# Patient Record
Sex: FEMALE | Race: WHITE | HISPANIC OR LATINO | URBAN - METROPOLITAN AREA
[De-identification: names, ages, dates, MRNs, and addresses within clinical notes are randomized per-mention and may not be internally consistent; named-entity substitution may affect disease eponyms.]

---

## 2021-06-24 ENCOUNTER — EMERGENCY (EMERGENCY)
Facility: HOSPITAL | Age: 31
LOS: 1 days | Discharge: HOME | End: 2021-06-24
Attending: STUDENT IN AN ORGANIZED HEALTH CARE EDUCATION/TRAINING PROGRAM
Payer: MEDICAID

## 2021-06-24 VITALS
WEIGHT: 119.05 LBS | OXYGEN SATURATION: 98 % | TEMPERATURE: 99 F | HEART RATE: 84 BPM | RESPIRATION RATE: 18 BRPM | SYSTOLIC BLOOD PRESSURE: 131 MMHG | DIASTOLIC BLOOD PRESSURE: 83 MMHG

## 2021-06-24 VITALS
DIASTOLIC BLOOD PRESSURE: 70 MMHG | SYSTOLIC BLOOD PRESSURE: 103 MMHG | RESPIRATION RATE: 18 BRPM | HEART RATE: 70 BPM | OXYGEN SATURATION: 98 %

## 2021-06-24 DIAGNOSIS — O36.80X0 PREGNANCY WITH INCONCLUSIVE FETAL VIABILITY, NOT APPLICABLE OR UNSPECIFIED: ICD-10-CM

## 2021-06-24 LAB
ALBUMIN SERPL ELPH-MCNC: 4.2 G/DL — SIGNIFICANT CHANGE UP (ref 3.3–5.2)
ALP SERPL-CCNC: 67 U/L — SIGNIFICANT CHANGE UP (ref 40–120)
ALT FLD-CCNC: 10 U/L — SIGNIFICANT CHANGE UP
ANION GAP SERPL CALC-SCNC: 10 MMOL/L — SIGNIFICANT CHANGE UP (ref 5–17)
APPEARANCE UR: CLEAR — SIGNIFICANT CHANGE UP
AST SERPL-CCNC: 15 U/L — SIGNIFICANT CHANGE UP
BACTERIA # UR AUTO: NEGATIVE — SIGNIFICANT CHANGE UP
BASOPHILS # BLD AUTO: 0.02 K/UL — SIGNIFICANT CHANGE UP (ref 0–0.2)
BASOPHILS NFR BLD AUTO: 0.4 % — SIGNIFICANT CHANGE UP (ref 0–2)
BILIRUB SERPL-MCNC: 0.2 MG/DL — LOW (ref 0.4–2)
BILIRUB UR-MCNC: NEGATIVE — SIGNIFICANT CHANGE UP
BLD GP AB SCN SERPL QL: SIGNIFICANT CHANGE UP
BUN SERPL-MCNC: 7.4 MG/DL — LOW (ref 8–20)
CALCIUM SERPL-MCNC: 9.3 MG/DL — SIGNIFICANT CHANGE UP (ref 8.6–10.2)
CHLORIDE SERPL-SCNC: 107 MMOL/L — SIGNIFICANT CHANGE UP (ref 98–107)
CO2 SERPL-SCNC: 24 MMOL/L — SIGNIFICANT CHANGE UP (ref 22–29)
COLOR SPEC: YELLOW — SIGNIFICANT CHANGE UP
CREAT SERPL-MCNC: 0.68 MG/DL — SIGNIFICANT CHANGE UP (ref 0.5–1.3)
DIFF PNL FLD: ABNORMAL
EOSINOPHIL # BLD AUTO: 0.08 K/UL — SIGNIFICANT CHANGE UP (ref 0–0.5)
EOSINOPHIL NFR BLD AUTO: 1.6 % — SIGNIFICANT CHANGE UP (ref 0–6)
EPI CELLS # UR: SIGNIFICANT CHANGE UP
GLUCOSE SERPL-MCNC: 83 MG/DL — SIGNIFICANT CHANGE UP (ref 70–99)
GLUCOSE UR QL: NEGATIVE MG/DL — SIGNIFICANT CHANGE UP
HCG SERPL-ACNC: 4305 MIU/ML — HIGH
HCT VFR BLD CALC: 36.7 % — SIGNIFICANT CHANGE UP (ref 34.5–45)
HGB BLD-MCNC: 12.5 G/DL — SIGNIFICANT CHANGE UP (ref 11.5–15.5)
IMM GRANULOCYTES NFR BLD AUTO: 0.4 % — SIGNIFICANT CHANGE UP (ref 0–1.5)
KETONES UR-MCNC: NEGATIVE — SIGNIFICANT CHANGE UP
LEUKOCYTE ESTERASE UR-ACNC: NEGATIVE — SIGNIFICANT CHANGE UP
LYMPHOCYTES # BLD AUTO: 1.22 K/UL — SIGNIFICANT CHANGE UP (ref 1–3.3)
LYMPHOCYTES # BLD AUTO: 23.6 % — SIGNIFICANT CHANGE UP (ref 13–44)
MCHC RBC-ENTMCNC: 30.6 PG — SIGNIFICANT CHANGE UP (ref 27–34)
MCHC RBC-ENTMCNC: 34.1 GM/DL — SIGNIFICANT CHANGE UP (ref 32–36)
MCV RBC AUTO: 89.7 FL — SIGNIFICANT CHANGE UP (ref 80–100)
MONOCYTES # BLD AUTO: 0.36 K/UL — SIGNIFICANT CHANGE UP (ref 0–0.9)
MONOCYTES NFR BLD AUTO: 7 % — SIGNIFICANT CHANGE UP (ref 2–14)
NEUTROPHILS # BLD AUTO: 3.46 K/UL — SIGNIFICANT CHANGE UP (ref 1.8–7.4)
NEUTROPHILS NFR BLD AUTO: 67 % — SIGNIFICANT CHANGE UP (ref 43–77)
NITRITE UR-MCNC: NEGATIVE — SIGNIFICANT CHANGE UP
PH UR: 7 — SIGNIFICANT CHANGE UP (ref 5–8)
PLATELET # BLD AUTO: 195 K/UL — SIGNIFICANT CHANGE UP (ref 150–400)
POTASSIUM SERPL-MCNC: 3.8 MMOL/L — SIGNIFICANT CHANGE UP (ref 3.5–5.3)
POTASSIUM SERPL-SCNC: 3.8 MMOL/L — SIGNIFICANT CHANGE UP (ref 3.5–5.3)
PROT SERPL-MCNC: 7.2 G/DL — SIGNIFICANT CHANGE UP (ref 6.6–8.7)
PROT UR-MCNC: 15 MG/DL
RBC # BLD: 4.09 M/UL — SIGNIFICANT CHANGE UP (ref 3.8–5.2)
RBC # FLD: 12 % — SIGNIFICANT CHANGE UP (ref 10.3–14.5)
RBC CASTS # UR COMP ASSIST: ABNORMAL /HPF (ref 0–4)
SODIUM SERPL-SCNC: 141 MMOL/L — SIGNIFICANT CHANGE UP (ref 135–145)
SP GR SPEC: 1.01 — SIGNIFICANT CHANGE UP (ref 1.01–1.02)
UROBILINOGEN FLD QL: NEGATIVE MG/DL — SIGNIFICANT CHANGE UP
WBC # BLD: 5.16 K/UL — SIGNIFICANT CHANGE UP (ref 3.8–10.5)
WBC # FLD AUTO: 5.16 K/UL — SIGNIFICANT CHANGE UP (ref 3.8–10.5)
WBC UR QL: SIGNIFICANT CHANGE UP

## 2021-06-24 PROCEDURE — 85025 COMPLETE CBC W/AUTO DIFF WBC: CPT

## 2021-06-24 PROCEDURE — 36415 COLL VENOUS BLD VENIPUNCTURE: CPT

## 2021-06-24 PROCEDURE — 99284 EMERGENCY DEPT VISIT MOD MDM: CPT | Mod: 25

## 2021-06-24 PROCEDURE — 86850 RBC ANTIBODY SCREEN: CPT

## 2021-06-24 PROCEDURE — 86900 BLOOD TYPING SEROLOGIC ABO: CPT

## 2021-06-24 PROCEDURE — 87086 URINE CULTURE/COLONY COUNT: CPT

## 2021-06-24 PROCEDURE — 80053 COMPREHEN METABOLIC PANEL: CPT

## 2021-06-24 PROCEDURE — 81001 URINALYSIS AUTO W/SCOPE: CPT

## 2021-06-24 PROCEDURE — 99284 EMERGENCY DEPT VISIT MOD MDM: CPT

## 2021-06-24 PROCEDURE — 86901 BLOOD TYPING SEROLOGIC RH(D): CPT

## 2021-06-24 PROCEDURE — 76801 OB US < 14 WKS SINGLE FETUS: CPT | Mod: 26

## 2021-06-24 PROCEDURE — 76817 TRANSVAGINAL US OBSTETRIC: CPT | Mod: 26

## 2021-06-24 PROCEDURE — 76801 OB US < 14 WKS SINGLE FETUS: CPT

## 2021-06-24 PROCEDURE — 76817 TRANSVAGINAL US OBSTETRIC: CPT

## 2021-06-24 PROCEDURE — 84702 CHORIONIC GONADOTROPIN TEST: CPT

## 2021-06-24 RX ORDER — ACETAMINOPHEN 500 MG
650 TABLET ORAL ONCE
Refills: 0 | Status: COMPLETED | OUTPATIENT
Start: 2021-06-24 | End: 2021-06-24

## 2021-06-24 RX ADMIN — Medication 650 MILLIGRAM(S): at 12:05

## 2021-06-24 RX ADMIN — Medication 650 MILLIGRAM(S): at 12:55

## 2021-06-24 NOTE — ED STATDOCS - ATTENDING CONTRIBUTION TO CARE
I, Radha Toro, performed a face to face bedside interview with this patient regarding history of present illness, review of symptoms and relevant past medical, social and family history.  I completed an independent physical examination. Medical decision making, follow-up on ordered tests (ie labs, radiologic studies) and re-evaluation of the patient's status has been communicated to the ACP.  Disposition of the patient will be based on test outcome and response to ED interventions.

## 2021-06-24 NOTE — ED ADULT NURSE NOTE - OBJECTIVE STATEMENT
Assumed care of pt at 11:44 in . Pt A&Ox3 c/o vaginal bleeding and cramping x1 month. Pt states her last period was 5/23 and has not stopped bleeding since. Pt states she bleeds through 1 pad a day. Pt has recently arrived from [facility] and has not seen PMD or OB since arrival. Pt denies significant PMH. Assumed care of pt at 11:44 in . Pt A&Ox3 c/o vaginal bleeding and cramping x1 month. Pt states her last period was 5/23 and has not stopped bleeding since. Pt states she bleeds through 1 pad a day. Pt has recently arrived from [facility] and has not seen PMD or OB since arrival. Pt denies significant PMH. Hospital  used.

## 2021-06-24 NOTE — CONSULT NOTE ADULT - SUBJECTIVE AND OBJECTIVE BOX
Name: HORTENSIA ZAMORA  MRN: [id number]    HORTENSIA ZAMORA is a 30y  LMP approximately 2021 presenting with vaginal bleeding for one month and elevated hCG.  She reports the onset of bleeding approximately 1 month ago that has been consistent. She reports the bleeding resembles her periods and she uses 4 pads per day. She reports diffuse abdominal cramping during this time, nausea in the morning, and breast tenderness. She also reports urinary discomfort but no increased frequency. She reports feeling dizzy for the last 3 weeks.   She found out today that she has an elevated hCG. She did not know that she was pregnant. She recently moved here from [facility] and does not have obstetrical or gynecologic care.     PAST OBSTETRICAL HISTORY:  -  ()    PAST GYN HISTORY: Denies history of abnormal paps, STDs, ovarian cysts, or uterine fibroids. + yeast  Menarche at 14, irregular cycles     PAST MEDICAL HISTORY:  Irregular menses      PAST SURGICAL HISTORY:  No significant past surgical history      Home Medications:      SOCIAL:  Denies tobacco or drug use. Social drinker. Feels safe at home.     ALLERGIES:  No Known Allergies      ROS:  CONSTITUTIONAL: No fever, weight loss, or fatigue  BREASTS: No pain, masses, or nipple discharge. + tenderness  RESPIRATORY: No shortness of breath  CARDIOVASCULAR: No chest pain, palpitations, or leg swelling. + dizziness  GASTROINTESTINAL: + abdominal pain, nausea. - vomiting, diarrhea or constipation.   GENITOURINARY: No dysuria or incontinence  SKIN: No itching, burning, rashes, or lesions   ENDOCRINE: No heat or cold intolerance; No hair loss  PSYCHIATRIC: No depression, anxiety, mood swings, or difficulty sleeping  HEME/LYMPH: No easy bruising, or bleeding gums    Vital Signs Last 24 Hrs  T(C): 37.2 (2021 11:06), Max: 37.2 (2021 11:06)  T(F): 98.9 (2021 11:06), Max: 98.9 (2021 11:06)  HR: 70 (2021 16:33) (70 - 84)  BP: 103/70 (2021 16:33) (103/70 - 131/83)  RR: 18 (2021 16:33) (18 - 18)  SpO2: 98% (2021 16:33) (98% - 98%)    PHYSICAL EXAM:  GEN: NAD, AOx3  CV: S1S2, RRR.  Pulm: CTABL, no WRR. Speaking in full sentences without shortness of breath.  Abd: Soft, Nontender, Nondistended. No rebound tenderness or guarding. Bowel sounds present  PELVIC:        EXTERNAL GENITALIA: atraumatic, no lesions        VAGINA: dark blood in the vaginal vault, - blood on valsalva        CERVIX: Pink, closed, multiparous        UTERUS: mildly enlarged, approximately 8 weeks        ADNEXA: not palpable, non-tender    LABS:                        12.5   5.16  )-----------( 195      ( 2021 12:06 )             36.7         141  |  107  |  7.4<L>  ----------------------------<  83  3.8   |  24.0  |  0.68    Ca    9.3      2021 12:06    TPro  7.2  /  Alb  4.2  /  TBili  0.2<L>  /  DBili  x   /  AST  15  /  ALT  10  /  AlkPhos  67      Urinalysis Basic - ( 2021 12:07 )    Color: Yellow / Appearance: Clear / S.010 / pH: x  Gluc: x / Ketone: Negative  / Bili: Negative / Urobili: Negative mg/dL   Blood: x / Protein: 15 mg/dL / Nitrite: Negative   Leuk Esterase: Negative / RBC: 6-10 /HPF / WBC 3-5   Sq Epi: x / Non Sq Epi: Occasional / Bacteria: Negative      HCG Quantitative, Serum: 4305.0 mIU/mL (21 @ 12:06)    ABO RH Interpretation: A POS (21 @ 12:22)      RADIOLOGY STUDIES:  < from: US Transvaginal, OB (21 @ 14:10) >     EXAM:  US OB LES THAN 14 WKS 1ST GEST                         EXAM:  US OB TRANSVAGINAL                          PROCEDURE DATE:  2021          INTERPRETATION:  CLINICAL INFORMATION: Pregnancy with vaginal bleeding, beta-hCG 4300    LMP: 2021    Estimated Gestational Age by LMP:    COMPARISON: None available.    Endovaginal and transabdominal pelvic sonogram.    FINDINGS:  Uterus: Normal size    12 mm endometrial thickness without evidence for intrauterine gestational sac or yolk sac.    Right ovary: 3.5 cm x 2.1 cm x 2.9 cm. Within normal limits.  Left ovary: 2.5 cm x 1.7 cm x 2.5 cm. Within normal limits.    Fluid: None.    IMPRESSION:  No intrauterine gestational sac or yolk sac. No adnexal mass or complex pelvic free fluid.Findings represent pregnancy of unknown location, for which differential diagnosis includes early IUP, occult ectopic pregnancy, or spontaneous . Continued close obstetrical and sonographic follow-up is advised.            SELWYN CHRISTIANSON MD; Attending Radiologist  This document has been electronically signed. 2021  3:56PM    < end of copied text >

## 2021-06-24 NOTE — ED STATDOCS - NSFOLLOWUPINSTRUCTIONS_ED_ALL_ED_FT
- John un seguimiento con subramanian médico de atención primaria en 1 o 2 días. Si no puede hacer un seguimiento con subramanian médico de atención primaria, regrese al servicio de urgencias por cualquier problema urgente.  - Busque atención médica inmediata ante cualquier signo o síntoma nuevo, que empeore o que le preocupe.  - Se le entregaron copias de todos los resultados de kailash pruebas, llévelas a subramanian médico de atención primaria para que revise los resultados anormales  **Regrese al departamento de emergencias moi sábado 24 de junio para exámenes de laboratorio y ultrasonido.**      - Si tiene dificultades para realizar un seguimiento, llame al: [phone] ([phone]) o visite [email or web address] para obtener un médico o especialista de [facility] que acepte subramanian seguro en subramanian área.    ¡Sentirse mejor!   Hemorragia vaginal catian el embarazo (primer trimestre)    Vaginal Bleeding During Pregnancy, First Trimester       Catina los primeros meses del embarazo, es común tener baldomero pequeña hemorragia vaginal (manchado). A veces, la hemorragia es normal y no causa problemas. En otras ocasiones, sin embargo, la hemorragia puede ser baldomero señal de algo grave. Informe a subramanian médico de inmediato si tiene algún tipo de hemorragia vaginal.      Siga estas indicaciones en subramanian casa:    Actividad     •Siga las indicaciones de subramanian médico con respecto al nader de actividad que puede realizar.      •De ser necesario, organícese para que alguien la ayude con las actividades habituales.      • No tenga relaciones sexuales ni orgasmos hasta que el médico le diga que es seguro.      Instrucciones generales     •[place] los medicamentos de venta tc y los recetados solamente ciara se lo haya indicado el médico.      •Controle subramanian afección para detectar cualquier cambio.    •Escriba los siguientes datos:  •La cantidad de toallas higiénicas que usa cada día.      •La frecuencia con la que se cambia las toallas higiénicas.      •Qué tan empapadas (saturadas) están.        • No use tampones.      • No se john duchas vaginales.      •Si elimina tejido por la vagina, guárdelo para mostrárselo al médico.      •Concurra a todas las visitas de seguimiento ciara se lo haya indicado el médico. [place] es importante.        Comuníquese con un médico si:    •Tiene baldomero hemorragia vaginal catina el embarazo.      •Tiene cólicos.      •Tiene fiebre.        Solicite ayuda de inmediato si:    •Siente cólicos muy intensos en la espalda o en el vientre (abdomen).      •Elimina coágulos grandes o mucho tejido por la vagina.      •La hemorragia empeora.      •Siente que va a desvanecerse.      •Se siente débil.      •Pierde el conocimiento (se desmaya).      •Tiene escalofríos.      •Tiene baldomero pérdida de líquido por la vagina.      •Tiene baldomero pérdida de líquido abundante por la vagina.        Resumen    •A veces, la hemorragia vaginal catina el embarazo es normal y no causa problemas. En otras ocasiones, la hemorragia puede ser baldomero señal de algo grave.      •Informe a subramanian médico de inmediato si tiene algún tipo de hemorragia vaginal.      •Siga las indicaciones de subramanian médico con respecto al naedr de actividad que puede realizar. [place] necesite que alguien la ayude con las actividades habituales.      Esta información no tiene ciara fin reemplazar el consejo del médico. Asegúrese de hacerle al médico cualquier pregunta que tenga.

## 2021-06-24 NOTE — ED STATDOCS - NS ED ROS FT
ROS:  GEN: (-) fevers/chills  NECK: (-) stiffness, (-) swelling  RESP: (-) shortness of breath, (-) cough  CV: (-) chest pain, (-) palpitations  GI: (-) nausea, (-) vomiting, (-) pain, (-) constipation, (-) diarrhea  : (-) hematuria, (-) dysuria (+) vaginal bleeding  EXT: (-) edema  NEURO: (-) weakness, (-) headache, (-) dizziness, (-) syncope  MSK: (-) muscle pain

## 2021-06-24 NOTE — ED STATDOCS - PATIENT PORTAL LINK FT
No You can access the FollowMyHealth Patient Portal offered by [facility] by registering at the following website: [email or web address]. By joining [facility]’s FollowMyHealth portal, you will also be able to view your health information using other applications (apps) compatible with our system.

## 2021-06-24 NOTE — ED STATDOCS - NSFOLLOWUPCLINICS_GEN_ALL_ED_FT
[id number] [place] [address]  Phone: [phone]  Fax:   Scheduled Appointment: 6/28/2021

## 2021-06-24 NOTE — ED STATDOCS - PROGRESS NOTE DETAILS
CATRACHITO William NOTE: Pt evaluated at bedside. Pt with vaginal bleeding for approx 4 weeks, going through 4 pads per day. Does not have GYN yet in US. Reports abdominal cramping, did not take anything today yet for pain  Pt evaluated prior by intake physician. Otherwise HPI/PE/ROS as noted above. Will follow up plan per intake physician. ED  Iraida Moise CATRACHITO William NOTE: + HCG, ultrasound ordered, pt informed of + HCG and need for ultrasound. ED  Iraida Moise utilized CATRACHITO William NOTE: US shows pregnancy unknown location. Seen by GYN who recommends close 48 hour follow up. Abd soft NTND no rebound or guarding. Discussion includes results, plan, and return precautions. Pt advised to f/u with PMD 1-2 days and specialists discussed.  Printed copies of available lab/radiology results contained within discharge packet. Pt verbalized understanding/agreement of plan. ED  Shaniqua Ball

## 2021-06-24 NOTE — CONSULT NOTE ADULT - ASSESSMENT
30 y.o.  LMP approximately 2021 evaluated for 1 month of vaginal bleeding and an elevated hcg of 4305. Differential diagnosis includes pregnancy of unknown location vs spontaneous  vs ectopic pregnancy. Patient reports diffuse abdominal pain with mild tenderness of left hand side, although pain is not elicited on exam. Her ultrasound does not show any pregnancy in the uterus, tubes, or adnexa at this time. Although she has an elevated hcg, this will need to be trended to see if it's increasing or decreasing to help with diagnosis. Patient was educated on the blood work and ultrasound results and given strict precautions to return to the ED if she is to have increased pain that does not improve with tylenol, pain that is localized to one side, increased vaginal bleeding, or signs of anemia such as dizziness, shortness of breath, fatigue, or chest pain. She is advised to return to the ED on  for a repeat beta hcg and ultrasound. She will follow up with the [facility] on  and establish care at that time.     Patient discussed with Elida Rascon and Ger. 30 y.o.  LMP approximately 2021 evaluated for 1 month of vaginal bleeding and an elevated hcg of 4305. Differential diagnosis includes pregnancy of unknown location vs spontaneous  vs ectopic pregnancy. Patient reports diffuse abdominal pain with mild tenderness of left hand side, although pain is not elicited on exam. She is hemodynamically stable at this time. Her ultrasound does not show any pregnancy in the uterus, tubes, or adnexa at this time. Although she has an elevated hcg, this will need to be trended to see if it's increasing or decreasing to help with diagnosis. Patient was educated on the blood work and ultrasound results and given strict precautions to return to the ED if she is to have increased pain that does not improve with tylenol, pain that is localized to one side, increased vaginal bleeding, or signs of anemia such as dizziness, shortness of breath, fatigue, or chest pain. She is advised to return to the ED on  for a repeat beta hcg and ultrasound. She will follow up with the [facility] clinic on  and establish care at that time.     Patient discussed with Elida Rascon and Ger. 30 y.o.  LMP approximately 2021 evaluated for 1 month of vaginal bleeding, abdominal discomfort, and an elevated hcg of 4305. Patient's abdominal exam was completely benign and there was minimal bleeding on the vaginal exam. She is hemodynamically stable at this time.  Differential diagnosis includes pregnancy of unknown location vs spontaneous  vs ectopic pregnancy. She has been bleeding for a whole month with no intrauterine gestation, making a spontaneous  likely. However, due to the elevated beta and no definitive evidence that there was an intrauterine gestation, ectopic cannot be ruled out at this time, hence this is currently a pregnancy of unknown location until proven otherwise.   The labs and images were reviewed and explained to the patient that although her beta hcg is above the discriminatory zone, a pregnancy was not visualized within the uterus, tubes or ovaries. Although she has an elevated hcg, one result does not give us enough information to have a definite diagnosis. It was explained that this will need repeat beta hcg to evaluate the trend to aid in diagnosis as well as sonograms. Patient was educated on the blood work and ultrasound results and given strict precautions to return to the ED if she is to have increased pain that does not improve with tylenol, pain that is localized to one side, increased vaginal bleeding, or signs of anemia such as dizziness, shortness of breath, fatigue, or chest pain. She is advised to return to the ED on  for a repeat beta hcg and ultrasound. She will follow up with the [facility] on  and establish care at that time.     Patient discussed with Elida Rascon and Ger.

## 2021-06-24 NOTE — ED STATDOCS - CLINICAL SUMMARY MEDICAL DECISION MAKING FREE TEXT BOX
Patient with vaginal bleeding x1 month, abdomen soft, non tender. Will check labs, HCG levels and re-assess.

## 2021-06-24 NOTE — ED STATDOCS - OBJECTIVE STATEMENT
29 y/o female with no prior PMHx of presents to the ED c/o vaginal bleeding. Patient reports of vaginal bleeding since May 25th with lower abdominal cramping. Patient also reports of having irregular period in the past but never like this before. Patient does not have a GYN doctor.    Alan      Denies fever, chest pain, nausea, vomiting, shortness of breath, cough, rash, headache, dizziness ,blood thinners , and surgical procedures 31 y/o female with no prior PMHx of presents to the ED c/o vaginal bleeding. Patient reports of vaginal bleeding since May 25th with lower abdominal cramping. Patient also reports of having irregular period in the past but never like this before. Patient does not have a GYN doctor.    Iraida      Denies fever, chest pain, nausea, vomiting, shortness of breath, cough, rash, headache, dizziness ,blood thinners , and surgical procedures

## 2021-06-25 LAB
CULTURE RESULTS: SIGNIFICANT CHANGE UP
SPECIMEN SOURCE: SIGNIFICANT CHANGE UP

## 2021-06-26 ENCOUNTER — EMERGENCY (EMERGENCY)
Facility: HOSPITAL | Age: 31
LOS: 1 days | Discharge: HOME | End: 2021-06-26
Attending: EMERGENCY MEDICINE
Payer: MEDICAID

## 2021-06-26 VITALS
RESPIRATION RATE: 20 BRPM | OXYGEN SATURATION: 97 % | HEART RATE: 81 BPM | DIASTOLIC BLOOD PRESSURE: 73 MMHG | WEIGHT: 110.01 LBS | HEIGHT: 62 IN | SYSTOLIC BLOOD PRESSURE: 114 MMHG | TEMPERATURE: 99 F

## 2021-06-26 DIAGNOSIS — O03.9 COMPLETE OR UNSPECIFIED SPONTANEOUS ABORTION WITHOUT COMPLICATION: ICD-10-CM

## 2021-06-26 PROBLEM — N92.6 IRREGULAR MENSTRUATION, UNSPECIFIED: Chronic | Status: ACTIVE | Noted: 2021-06-24

## 2021-06-26 LAB
ALBUMIN SERPL ELPH-MCNC: 4.3 G/DL — SIGNIFICANT CHANGE UP (ref 3.3–5.2)
ALP SERPL-CCNC: 59 U/L — SIGNIFICANT CHANGE UP (ref 40–120)
ALT FLD-CCNC: 10 U/L — SIGNIFICANT CHANGE UP
ANION GAP SERPL CALC-SCNC: 9 MMOL/L — SIGNIFICANT CHANGE UP (ref 5–17)
AST SERPL-CCNC: 15 U/L — SIGNIFICANT CHANGE UP
BASOPHILS # BLD AUTO: 0.02 K/UL — SIGNIFICANT CHANGE UP (ref 0–0.2)
BASOPHILS NFR BLD AUTO: 0.3 % — SIGNIFICANT CHANGE UP (ref 0–2)
BILIRUB SERPL-MCNC: <0.2 MG/DL — LOW (ref 0.4–2)
BUN SERPL-MCNC: 16 MG/DL — SIGNIFICANT CHANGE UP (ref 8–20)
CALCIUM SERPL-MCNC: 9 MG/DL — SIGNIFICANT CHANGE UP (ref 8.6–10.2)
CHLORIDE SERPL-SCNC: 107 MMOL/L — SIGNIFICANT CHANGE UP (ref 98–107)
CO2 SERPL-SCNC: 25 MMOL/L — SIGNIFICANT CHANGE UP (ref 22–29)
CREAT SERPL-MCNC: 0.66 MG/DL — SIGNIFICANT CHANGE UP (ref 0.5–1.3)
EOSINOPHIL # BLD AUTO: 0.15 K/UL — SIGNIFICANT CHANGE UP (ref 0–0.5)
EOSINOPHIL NFR BLD AUTO: 2.5 % — SIGNIFICANT CHANGE UP (ref 0–6)
GLUCOSE SERPL-MCNC: 83 MG/DL — SIGNIFICANT CHANGE UP (ref 70–99)
HCG SERPL-ACNC: 1097 MIU/ML — HIGH
HCT VFR BLD CALC: 36 % — SIGNIFICANT CHANGE UP (ref 34.5–45)
HGB BLD-MCNC: 12.2 G/DL — SIGNIFICANT CHANGE UP (ref 11.5–15.5)
IMM GRANULOCYTES NFR BLD AUTO: 0.5 % — SIGNIFICANT CHANGE UP (ref 0–1.5)
LYMPHOCYTES # BLD AUTO: 1.72 K/UL — SIGNIFICANT CHANGE UP (ref 1–3.3)
LYMPHOCYTES # BLD AUTO: 28.1 % — SIGNIFICANT CHANGE UP (ref 13–44)
MCHC RBC-ENTMCNC: 31.2 PG — SIGNIFICANT CHANGE UP (ref 27–34)
MCHC RBC-ENTMCNC: 33.9 GM/DL — SIGNIFICANT CHANGE UP (ref 32–36)
MCV RBC AUTO: 92.1 FL — SIGNIFICANT CHANGE UP (ref 80–100)
MONOCYTES # BLD AUTO: 0.36 K/UL — SIGNIFICANT CHANGE UP (ref 0–0.9)
MONOCYTES NFR BLD AUTO: 5.9 % — SIGNIFICANT CHANGE UP (ref 2–14)
NEUTROPHILS # BLD AUTO: 3.84 K/UL — SIGNIFICANT CHANGE UP (ref 1.8–7.4)
NEUTROPHILS NFR BLD AUTO: 62.7 % — SIGNIFICANT CHANGE UP (ref 43–77)
PLATELET # BLD AUTO: 198 K/UL — SIGNIFICANT CHANGE UP (ref 150–400)
POTASSIUM SERPL-MCNC: 3.9 MMOL/L — SIGNIFICANT CHANGE UP (ref 3.5–5.3)
POTASSIUM SERPL-SCNC: 3.9 MMOL/L — SIGNIFICANT CHANGE UP (ref 3.5–5.3)
PROT SERPL-MCNC: 6.7 G/DL — SIGNIFICANT CHANGE UP (ref 6.6–8.7)
RBC # BLD: 3.91 M/UL — SIGNIFICANT CHANGE UP (ref 3.8–5.2)
RBC # FLD: 12.2 % — SIGNIFICANT CHANGE UP (ref 10.3–14.5)
SODIUM SERPL-SCNC: 141 MMOL/L — SIGNIFICANT CHANGE UP (ref 135–145)
WBC # BLD: 6.12 K/UL — SIGNIFICANT CHANGE UP (ref 3.8–10.5)
WBC # FLD AUTO: 6.12 K/UL — SIGNIFICANT CHANGE UP (ref 3.8–10.5)

## 2021-06-26 PROCEDURE — 99285 EMERGENCY DEPT VISIT HI MDM: CPT

## 2021-06-26 PROCEDURE — 99284 EMERGENCY DEPT VISIT MOD MDM: CPT | Mod: 25

## 2021-06-26 PROCEDURE — 76801 OB US < 14 WKS SINGLE FETUS: CPT

## 2021-06-26 PROCEDURE — 85025 COMPLETE CBC W/AUTO DIFF WBC: CPT

## 2021-06-26 PROCEDURE — 76817 TRANSVAGINAL US OBSTETRIC: CPT | Mod: 26

## 2021-06-26 PROCEDURE — 36415 COLL VENOUS BLD VENIPUNCTURE: CPT

## 2021-06-26 PROCEDURE — 84702 CHORIONIC GONADOTROPIN TEST: CPT

## 2021-06-26 PROCEDURE — 80053 COMPREHEN METABOLIC PANEL: CPT

## 2021-06-26 PROCEDURE — 76817 TRANSVAGINAL US OBSTETRIC: CPT

## 2021-06-26 PROCEDURE — 76801 OB US < 14 WKS SINGLE FETUS: CPT | Mod: 26

## 2021-06-26 NOTE — ED STATDOCS - NS ED ROS FT
Review of Systems  •	CONSTITUTIONAL - no  fever, no diaphoresis, no weight change  •	SKIN - no rash  •	HEMATOLOGIC - no bleeding, no bruising  •	EYES - no eye pain, no blurred vision  •	ENT - no change in hearing, no pain  •	RESPIRATORY - no shortness of breath, no cough  •	CARDIAC - no chest pain, no palpitations  •	GI - + abd pain, no nausea, no vomiting, no diarrhea, no constipation, no bleeding  •	GENITO-URINARY - no discharge, no dysuria; no hematuria, + vaginal bleeding   •	ENDO - no polydipsia, no polyuria, no heat/no cold intolerance  •	MUSCULOSKELETAL - no joint pain, no swelling, no redness  •	NEUROLOGIC - no weakness, no headache, no anesthesia, no paresthesias  •	PSYCH - no anxiety, non suicidal, non homicidal, no hallucination, no depression

## 2021-06-26 NOTE — ED STATDOCS - OBJECTIVE STATEMENT
29 y/o female denies PMHx of p/w vaginal bleeding. Pt states she was here Friday and was told to come back for testing. Pt states she has been bleeding since May 24th, was told she was pregnant on Thursday. Pt has not seen GYN, does not have GYN. , Pt denies fevers, chills cough, dysuria, N/V. Pt states she has new abdominal pain. Pt states they could no visualize pregnancy on last US.    : Aries

## 2021-06-26 NOTE — ED STATDOCS - PHYSICAL EXAMINATION
VITAL SIGNS: I have reviewed nursing notes and confirm.  CONSTITUTIONAL: Well-developed; well-nourished; in no acute distress.  SKIN: Skin exam is warm and dry, no acute rash.  HEAD: Normocephalic; atraumatic.  EYES: PERRL, EOM intact; conjunctiva and sclera clear.  ENT: No nasal discharge; airway clear. Throat clear.  NECK: Supple; non tender.    CARD: S1, S2 normal; Regular rate and rhythm.  RESP: No wheezes,  no rales or rhonchi.   ABD:  soft; non-distended; + right pelvic tenderness   EXT: Normal ROM. No clubbing, cyanosis or edema.  NEURO: Alert, oriented. Grossly unremarkable. No focal deficits. no facial droop, moves all extremities,  normal gait   PSYCH: Cooperative, appropriate.

## 2021-06-26 NOTE — CONSULT NOTE ADULT - SUBJECTIVE AND OBJECTIVE BOX
Name: HORTENSIA ZAMORA  MRN: [id number]    HORTENSIA ZAMORA is a 30y  LMP unknown presenting with vaginal bleeding.   She reports continued vaginal bleeding since she was seen 2 days ago. She reports mild cramping, but no increased pain. She denies fever, chills, nausea, vomiting, constipation, diarrhea, and dysuria.   Her beta hcg 2 days ago was 4305.     PAST OBSTETRICAL HISTORY:  -  ()    PAST GYN HISTORY: Denies history of abnormal paps, STDs, ovarian cysts, or uterine fibroids. + yeast  Menarche at 14, irregular cycles     PAST MEDICAL HISTORY:  Irregular menses      PAST SURGICAL HISTORY:  No significant past surgical history      Home Medications:      SOCIAL:  Denies tobacco or drug use. Social drinker. Feels safe at home.     ALLERGIES:  No Known Allergies      ROS:  CONSTITUTIONAL: No fever, weight loss, or fatigue  BREASTS: No pain, masses, or nipple discharge. + tenderness  RESPIRATORY: No shortness of breath  CARDIOVASCULAR: No chest pain, palpitations, leg swelling, and dizziness  GASTROINTESTINAL: No abdominal pain, nausea, vomiting, diarrhea or constipation.   GENITOURINARY: No dysuria or incontinence  SKIN: No itching, burning, rashes, or lesions   ENDOCRINE: No heat or cold intolerance; No hair loss  PSYCHIATRIC: No depression, anxiety, mood swings, or difficulty sleeping  HEME/LYMPH: No easy bruising, or bleeding gums    Vital Signs Last 24 Hrs  T(C): 37.1 (2021 15:01), Max: 37.1 (2021 15:01)  T(F): 98.7 (2021 15:01), Max: 98.7 (2021 15:01)  HR: 81 (2021 15:01) (81 - 81)  BP: 114/73 (2021 15:01) (114/73 - 114/73)  RR: 20 (2021 15:01) (20 - 20)  SpO2: 97% (2021 15:01) (97% - 97%)    PHYSICAL EXAM:  GEN: NAD, AOx3  CV: S1S2, RRR.  Pulm: CTABL, no WRR. Speaking in full sentences without shortness of breath.  Abd: Soft, Nontender, Nondistended. No rebound tenderness or guarding. Bowel sounds present  PELVIC: deferred    LABS:                        12.2   6.12  )-----------( 198      ( 2021 16:46 )             36.0         141  |  107  |  16.0  ----------------------------<  83  3.9   |  25.0  |  0.66    Ca    9.0      2021 16:46    TPro  6.7  /  Alb  4.3  /  TBili  <0.2<L>  /  DBili  x   /  AST  15  /  ALT  10  /  AlkPhos  59        HCG Quantitative, Serum: 1097.0 mIU/mL (21 @ 16:46)  HCG Quantitative, Serum: 4305.0 mIU/mL (21 @ 12:06)    ABO RH Interpretation: A POS (21 @ 12:22)      RADIOLOGY STUDIES:  < from: US Transvaginal, OB (21 @ 17:22) >  EXAM:  US OB LES THAN 14 WKS 1ST GEST                         EXAM:  US OB TRANSVAGINAL                          PROCEDURE DATE:  2021          INTERPRETATION:  CLINICAL INFORMATION: Vaginal bleeding and cramping, pregnant    LMP: 2021    Estimated Gestational Age by LMP: 4 weeks and 4 days    COMPARISON: 2021 available.    Endovaginal and transabdominal pelvic sonogram. Color and Spectral Doppler was performed.    FINDINGS:  Uterus: 8.9 x 4.6 x 5.7 cm. There is mild endometrial thickening in the lower uterine segment up to 1.2 cm. No gestational sac identified.    Right ovary: 2.8 cm x 1.9 cm x 3.2 cm. Within normal limits. Normal arterial and venous waveforms.  Left ovary: 2.8 cm x 1.6 cm x 2.3 cm. Within normal limits.Normal arterial and venous waveforms.    Fluid: None.    IMPRESSION:  Pregnancy of unknown location. There is mild endometrial thickening in the lower uterine segment. However, no gestational sac identified. Correlation with beta-hCG levels and short-term follow-up pelvic ultrasound are recommended.            KENISHA MESA MD; Attending Radiologist  This document has been electronically signed. 2021  5:49PM

## 2021-06-26 NOTE — ED STATDOCS - PATIENT PORTAL LINK FT
You can access the FollowMyHealth Patient Portal offered by [facility] by registering at the following website: [email or web address]. By joining [facility]’s FollowMyHealth portal, you will also be able to view your health information using other applications (apps) compatible with our system.

## 2021-06-26 NOTE — ED STATDOCS - CLINICAL SUMMARY MEDICAL DECISION MAKING FREE TEXT BOX
pt with pregnancy of length, pt was seen here 2 days ago US showed pregnancy of unknown location. PT told to come back for repeat beta and US.

## 2021-06-26 NOTE — ED STATDOCS - ATTENDING CONTRIBUTION TO CARE
I, Nadeem Arenas, performed the initial face to face bedside interview with this patient regarding history of present illness, review of symptoms and relevant past medical, social and family history.  I completed an independent physical examination.  I was the initial provider who evaluated this patient. I have signed out the follow up of any pending tests (i.e. labs, radiological studies) to the ACP.  I have communicated the patient’s plan of care and disposition with the ACP.

## 2021-06-26 NOTE — ED ADULT NURSE NOTE - OBJECTIVE STATEMENT
pt a&ox4, vss, came in for abdominal cramping and vaginal bleeding since may 27th. pt was tld she was pregnant last visit.

## 2021-06-26 NOTE — ED STATDOCS - NSFOLLOWUPINSTRUCTIONS_ED_ALL_ED_FT
Follow up with [facility] clinic this week    Return to ED for any new or worsening symptoms     Aborto natural    LO QUE NECESITA SABER:    ¿Qué es un aborto natural?Un aborto espontáneo es la pérdida de un feto antes de las 20 semanas de embarazo. Un aborto natural también se conoce ciara aborto espontáneo o pérdida temprana del embarazo.    ¿Qué causa un aborto natural?Es posible que se desconozca la causa de subramanian pérdida de embarazo. Los siguientes podrían aumentar el riesgo:  •Ser mayor de 35 años de edad      •Problemas genéticos en el feto      •Falta de control de la diabetes, hipertensión o enfermedad de la tiroides      •Baldomero infección ciara la toxoplasmosis o sífilis      •Consumir bebidas alcohólicas o con cafeína, fumar o consumir drogas catina el embarazo      •Tener sobrepeso o estar por debajo del peso apropiado      •Problemas del útero, placenta o del cesar uterino      ¿Cuáles son los signos y síntomas de un aborto natural?Es posible que no tenga síntomas de un aborto natural o que tenga cualquiera de los siguientes:  •Manchado vaginal o sangrado abundante      •Dolor o cólicos en el abdomen o área lumbar      •Flujo con ethan, tejidos o coágulos que provienen de subramanian vagina      •Náuseas o vómitos      •Mareos      ¿Cuál es el tratamiento para un aborto natural?Es posible que no se necesite de tratamiento para un aborto natural. Usted podría necesitar alguno de los siguientes si usted tiene sangrado abundante o algo de tejido se queda en subramanian útero después de baldomero pérdida del embarazo:  •Medicamentospodría ser administrado para controlar el sangrado y evitar baldomero infección. También le podrían administrar un medicamento para controlar el dolor y evitar complicaciones en futuros embarazos.      •La cirugíapodría ser necesaria para remover el tejido restante en subramanian útero. La cirugía podría incluir baldomero dilatación y legrado al igual que baldomero dilatación y evacuación. También se podrían necesitar de baldomero cirugía para controlar el sangrado o para evitar baldomero infección.      ¿Cómo puedo cuidar de mí misma después de un aborto natural?  •No se ponga nada dentro de subramanian vagina por 2 semanas o según las indicaciones.No use tampones, duchas vaginales ni tenga relaciones sexuales. Estas acciones pueden causar baldomero infección y dolor.      •Use toallas sanitarias según las necesidades.Es posible que usted tenga sangrado o manchado leve por 2 semanas.      •No tome rhea de sancho ni vaya a nadar por 2 semanas o según las indicaciones.Estas acciones pueden aumentar subramanian riesgo de contraer baldomero infección. Sólo tome duchas.      •Descanse tanto ciara sea necesario.Empiece a hacer un poco más día a día. Regrese a kailash actividades diarias ciara se le haya indicado.      •Consulte con subramanian médico sobre los métodos anticonceptivos.En fauzia que le interese prevenir otro embarazo, pregunte a subramanian médico cuál método anticonceptivo le recomienda.      •Unirse a un rad de apoyo o la terapia emocional puede ser beneficioso para afrontar kailash sentimientos.Un aborto natural puede ser muy difícil para usted, subramanian amanda y otros miembros de subramanian marino. Después de baldomero pérdida del embarazo se pueden sentir muchos sentimientos. Usted podría sentir baldomero salmeron intensa u otros sentimientos. Podría ser beneficioso hablar con baldomero amiga, con un familiar o con un consejero acerca de kailash sentimientos. Usted también podría estar preocupada por la posibilidad de sufrir otro aborto natural. Consulte con subramanian médico acerca de kailash preocupaciones. El médico podría ayudarla a disminuir el riesgo de otro aborto natural. También podría ayudarla a encontrar formas para sobrellevar la salmeron y aflicción que siente.      ¿Dónde puedo obtener más información?  •The [employer]  [address] [address]  [place] [address]  Phone: [phone]  Phone: [phone]  Web Address: http://www.acog.org      •[facility]  [address] [place] [address]  Web Address: [email or web address]        ¿Cuándo caity buscar atención inmediata?  •Tiene secreción o pus malolientes saliendo de subramanian vagina.      •Usted tiene sangrado vaginal abundante y en el transcurso de baldomero hora empapa más de 1 toalla sanitaria.      •Usted tiene dolor abdominal intenso.      •Usted siente que subramanian corazón está latiendo más rápido de lo normal.      •Usted se siente sumamente mareado o débil.      ¿Cuándo caity comunicarme con mi médico?  •Usted tiene baldomero temperatura superior a los 100.4 °F o escalofríos.      •Usted tiene baldomero enorme tristeza, salmeron o siente que no puede lidiar con lo que le ha pasado.      •Usted tiene preguntas o inquietudes acerca de subramanian condición o cuidado.

## 2021-06-26 NOTE — ED STATDOCS - PROGRESS NOTE DETAILS
PT evaluated by intake physician. HPI/PE/ROS as noted above. Will follow up plan per intake physician. pt has decreased HCG but still pregnancy of unknown location on US. GYN called for consult. PA NOTE: Pt evaluated by OBGYN. Likely miscarriage. Pt advised she must follow up with [facility] clinic this week. Pt given strict return precautions and verbalized understanding.

## 2021-06-26 NOTE — CONSULT NOTE ADULT - ASSESSMENT
30 y.o.  LMP unknown evaluated for vaginal bleeding to have a spontaneous miscarriage. She reports continued bleeding. VSS. Labs reviewed; hgb stable at 12.2, A positive. Beta hcg is downtrending from 4305 to 1097. No IUP is noted, which was likely passed earlier during her episodes of bleeding. Patient is referred to [facility] for continued follow up and repeat beta hcg until pregnancy resolves. All concerns and questions answered.     Patient discussed with Dr. Martinez

## 2022-09-23 ENCOUNTER — ASOB RESULT (OUTPATIENT)
Age: 32
End: 2022-09-23

## 2022-09-23 ENCOUNTER — APPOINTMENT (OUTPATIENT)
Dept: ANTEPARTUM | Facility: CLINIC | Age: 32
End: 2022-09-23

## 2022-09-23 PROCEDURE — 76805 OB US >/= 14 WKS SNGL FETUS: CPT

## 2022-10-27 ENCOUNTER — APPOINTMENT (OUTPATIENT)
Dept: OBGYN | Facility: CLINIC | Age: 32
End: 2022-10-27

## 2022-10-27 ENCOUNTER — NON-APPOINTMENT (OUTPATIENT)
Age: 32
End: 2022-10-27

## 2022-10-27 PROCEDURE — 0502F SUBSEQUENT PRENATAL CARE: CPT

## 2022-10-28 ENCOUNTER — NON-APPOINTMENT (OUTPATIENT)
Age: 32
End: 2022-10-28

## 2022-10-28 DIAGNOSIS — Z87.59 PERSONAL HISTORY OF OTHER COMPLICATIONS OF PREGNANCY, CHILDBIRTH AND THE PUERPERIUM: ICD-10-CM

## 2022-11-03 ENCOUNTER — NON-APPOINTMENT (OUTPATIENT)
Age: 32
End: 2022-11-03

## 2022-11-03 ENCOUNTER — APPOINTMENT (OUTPATIENT)
Dept: OBGYN | Facility: CLINIC | Age: 32
End: 2022-11-03

## 2022-11-03 DIAGNOSIS — Z32.01 ENCOUNTER FOR PREGNANCY TEST, RESULT POSITIVE: ICD-10-CM

## 2022-11-03 PROCEDURE — 0501F PRENATAL FLOW SHEET: CPT

## 2022-11-03 PROCEDURE — 0502F SUBSEQUENT PRENATAL CARE: CPT

## 2022-11-03 RX ORDER — PRENATAL VIT NO.126/IRON/FOLIC 28MG-0.8MG
28-0.8 TABLET ORAL DAILY
Qty: 90 | Refills: 2 | Status: ACTIVE | COMMUNITY
Start: 2022-11-03 | End: 1900-01-01

## 2022-11-09 ENCOUNTER — APPOINTMENT (OUTPATIENT)
Dept: INTERNAL MEDICINE | Facility: CLINIC | Age: 32
End: 2022-11-09

## 2022-11-18 ENCOUNTER — APPOINTMENT (OUTPATIENT)
Dept: ANTEPARTUM | Facility: CLINIC | Age: 32
End: 2022-11-18

## 2022-11-18 ENCOUNTER — ASOB RESULT (OUTPATIENT)
Age: 32
End: 2022-11-18

## 2022-11-18 PROCEDURE — 76816 OB US FOLLOW-UP PER FETUS: CPT

## 2022-11-21 ENCOUNTER — NON-APPOINTMENT (OUTPATIENT)
Age: 32
End: 2022-11-21

## 2022-11-22 ENCOUNTER — NON-APPOINTMENT (OUTPATIENT)
Age: 32
End: 2022-11-22

## 2022-12-08 ENCOUNTER — ASOB RESULT (OUTPATIENT)
Age: 32
End: 2022-12-08

## 2022-12-08 ENCOUNTER — APPOINTMENT (OUTPATIENT)
Dept: ANTEPARTUM | Facility: CLINIC | Age: 32
End: 2022-12-08
Payer: MEDICAID

## 2022-12-08 ENCOUNTER — APPOINTMENT (OUTPATIENT)
Dept: OBGYN | Facility: CLINIC | Age: 32
End: 2022-12-08

## 2022-12-08 VITALS — DIASTOLIC BLOOD PRESSURE: 70 MMHG | WEIGHT: 129 LBS | SYSTOLIC BLOOD PRESSURE: 110 MMHG

## 2022-12-08 PROCEDURE — 76816 OB US FOLLOW-UP PER FETUS: CPT

## 2022-12-08 PROCEDURE — 0502F SUBSEQUENT PRENATAL CARE: CPT

## 2022-12-08 PROCEDURE — 76819 FETAL BIOPHYS PROFIL W/O NST: CPT | Mod: 59

## 2022-12-12 ENCOUNTER — APPOINTMENT (OUTPATIENT)
Dept: ANTEPARTUM | Facility: CLINIC | Age: 32
End: 2022-12-12

## 2022-12-22 ENCOUNTER — APPOINTMENT (OUTPATIENT)
Dept: OBGYN | Facility: CLINIC | Age: 32
End: 2022-12-22

## 2022-12-22 VITALS — WEIGHT: 129 LBS | SYSTOLIC BLOOD PRESSURE: 116 MMHG | DIASTOLIC BLOOD PRESSURE: 74 MMHG

## 2022-12-22 PROCEDURE — 0502F SUBSEQUENT PRENATAL CARE: CPT

## 2022-12-28 ENCOUNTER — APPOINTMENT (OUTPATIENT)
Dept: ANTEPARTUM | Facility: CLINIC | Age: 32
End: 2022-12-28

## 2022-12-28 ENCOUNTER — ASOB RESULT (OUTPATIENT)
Age: 32
End: 2022-12-28

## 2022-12-28 PROCEDURE — 76816 OB US FOLLOW-UP PER FETUS: CPT

## 2022-12-28 PROCEDURE — 76820 UMBILICAL ARTERY ECHO: CPT | Mod: 59

## 2022-12-28 PROCEDURE — 93976 VASCULAR STUDY: CPT

## 2022-12-28 PROCEDURE — 59025 FETAL NON-STRESS TEST: CPT

## 2022-12-28 PROCEDURE — 76821 MIDDLE CEREBRAL ARTERY ECHO: CPT

## 2023-01-03 ENCOUNTER — NON-APPOINTMENT (OUTPATIENT)
Age: 33
End: 2023-01-03

## 2023-01-03 ENCOUNTER — APPOINTMENT (OUTPATIENT)
Dept: ANTEPARTUM | Facility: CLINIC | Age: 33
End: 2023-01-03

## 2023-01-05 ENCOUNTER — NON-APPOINTMENT (OUTPATIENT)
Age: 33
End: 2023-01-05

## 2023-01-05 ENCOUNTER — APPOINTMENT (OUTPATIENT)
Dept: OBGYN | Facility: CLINIC | Age: 33
End: 2023-01-05
Payer: MEDICAID

## 2023-01-05 VITALS
HEIGHT: 59 IN | BODY MASS INDEX: 26.41 KG/M2 | SYSTOLIC BLOOD PRESSURE: 102 MMHG | DIASTOLIC BLOOD PRESSURE: 60 MMHG | WEIGHT: 131 LBS

## 2023-01-05 PROCEDURE — 0502F SUBSEQUENT PRENATAL CARE: CPT

## 2023-01-06 ENCOUNTER — APPOINTMENT (OUTPATIENT)
Dept: ANTEPARTUM | Facility: CLINIC | Age: 33
End: 2023-01-06

## 2023-01-10 ENCOUNTER — NON-APPOINTMENT (OUTPATIENT)
Age: 33
End: 2023-01-10

## 2023-01-10 ENCOUNTER — APPOINTMENT (OUTPATIENT)
Dept: ANTEPARTUM | Facility: CLINIC | Age: 33
End: 2023-01-10
Payer: MEDICAID

## 2023-01-10 ENCOUNTER — ASOB RESULT (OUTPATIENT)
Age: 33
End: 2023-01-10

## 2023-01-10 PROCEDURE — 76820 UMBILICAL ARTERY ECHO: CPT

## 2023-01-10 PROCEDURE — 76818 FETAL BIOPHYS PROFILE W/NST: CPT

## 2023-01-10 PROCEDURE — ZZZZZ: CPT

## 2023-01-10 PROCEDURE — 93976 VASCULAR STUDY: CPT

## 2023-01-11 ENCOUNTER — NON-APPOINTMENT (OUTPATIENT)
Age: 33
End: 2023-01-11

## 2023-01-13 ENCOUNTER — APPOINTMENT (OUTPATIENT)
Dept: ANTEPARTUM | Facility: CLINIC | Age: 33
End: 2023-01-13

## 2023-01-16 ENCOUNTER — APPOINTMENT (OUTPATIENT)
Dept: OBGYN | Facility: CLINIC | Age: 33
End: 2023-01-16
Payer: MEDICAID

## 2023-01-16 VITALS
BODY MASS INDEX: 27.21 KG/M2 | WEIGHT: 135 LBS | HEIGHT: 59 IN | SYSTOLIC BLOOD PRESSURE: 110 MMHG | DIASTOLIC BLOOD PRESSURE: 66 MMHG

## 2023-01-16 PROCEDURE — 0502F SUBSEQUENT PRENATAL CARE: CPT

## 2023-01-17 ENCOUNTER — APPOINTMENT (OUTPATIENT)
Dept: ANTEPARTUM | Facility: CLINIC | Age: 33
End: 2023-01-17
Payer: MEDICAID

## 2023-01-17 ENCOUNTER — ASOB RESULT (OUTPATIENT)
Age: 33
End: 2023-01-17

## 2023-01-17 PROCEDURE — 76820 UMBILICAL ARTERY ECHO: CPT

## 2023-01-17 PROCEDURE — 76818 FETAL BIOPHYS PROFILE W/NST: CPT

## 2023-01-17 PROCEDURE — ZZZZZ: CPT

## 2023-01-18 ENCOUNTER — INPATIENT (INPATIENT)
Facility: HOSPITAL | Age: 33
LOS: 1 days | Discharge: HOME | End: 2023-01-20
Payer: MEDICAID

## 2023-01-18 ENCOUNTER — APPOINTMENT (OUTPATIENT)
Dept: INTERNAL MEDICINE | Facility: CLINIC | Age: 33
End: 2023-01-18

## 2023-01-18 VITALS — TEMPERATURE: 97 F

## 2023-01-18 DIAGNOSIS — O36.5930 MATERNAL CARE FOR OTHER KNOWN OR SUSPECTED POOR FETAL GROWTH, THIRD TRIMESTER, NOT APPLICABLE OR UNSPECIFIED: ICD-10-CM

## 2023-01-18 DIAGNOSIS — Z20.822 CONTACT WITH AND (SUSPECTED) EXPOSURE TO COVID-19: ICD-10-CM

## 2023-01-18 DIAGNOSIS — Z00.00 ENCOUNTER FOR GENERAL ADULT MEDICAL EXAMINATION W/OUT ABNORMAL FINDINGS: ICD-10-CM

## 2023-01-18 DIAGNOSIS — Z3A.39 39 WEEKS GESTATION OF PREGNANCY: ICD-10-CM

## 2023-01-18 DIAGNOSIS — Z28.21 IMMUNIZATION NOT CARRIED OUT BECAUSE OF PATIENT REFUSAL: ICD-10-CM

## 2023-01-18 DIAGNOSIS — Z28.09 IMMUNIZATION NOT CARRIED OUT BECAUSE OF OTHER CONTRAINDICATION: ICD-10-CM

## 2023-01-18 LAB
BASOPHILS # BLD AUTO: 0.03 K/UL — SIGNIFICANT CHANGE UP (ref 0–0.2)
BASOPHILS NFR BLD AUTO: 0.5 % — SIGNIFICANT CHANGE UP (ref 0–2)
BLD GP AB SCN SERPL QL: SIGNIFICANT CHANGE UP
COVID-19 SPIKE DOMAIN AB INTERP: POSITIVE
COVID-19 SPIKE DOMAIN ANTIBODY RESULT: >250 U/ML — HIGH
EOSINOPHIL # BLD AUTO: 0.13 K/UL — SIGNIFICANT CHANGE UP (ref 0–0.5)
EOSINOPHIL NFR BLD AUTO: 2 % — SIGNIFICANT CHANGE UP (ref 0–6)
HCT VFR BLD CALC: 32.3 % — LOW (ref 34.5–45)
HGB BLD-MCNC: 10.8 G/DL — LOW (ref 11.5–15.5)
HIV 1 & 2 AB SERPL IA.RAPID: SIGNIFICANT CHANGE UP
HIV 1+2 AB+HIV1 P24 AG SERPL QL IA: SIGNIFICANT CHANGE UP
IMM GRANULOCYTES NFR BLD AUTO: 2 % — HIGH (ref 0–0.9)
LYMPHOCYTES # BLD AUTO: 1.92 K/UL — SIGNIFICANT CHANGE UP (ref 1–3.3)
LYMPHOCYTES # BLD AUTO: 29.1 % — SIGNIFICANT CHANGE UP (ref 13–44)
MCHC RBC-ENTMCNC: 29.7 PG — SIGNIFICANT CHANGE UP (ref 27–34)
MCHC RBC-ENTMCNC: 33.4 GM/DL — SIGNIFICANT CHANGE UP (ref 32–36)
MCV RBC AUTO: 88.7 FL — SIGNIFICANT CHANGE UP (ref 80–100)
MONOCYTES # BLD AUTO: 0.44 K/UL — SIGNIFICANT CHANGE UP (ref 0–0.9)
MONOCYTES NFR BLD AUTO: 6.7 % — SIGNIFICANT CHANGE UP (ref 2–14)
NEUTROPHILS # BLD AUTO: 3.95 K/UL — SIGNIFICANT CHANGE UP (ref 1.8–7.4)
NEUTROPHILS NFR BLD AUTO: 59.7 % — SIGNIFICANT CHANGE UP (ref 43–77)
PLATELET # BLD AUTO: 167 K/UL — SIGNIFICANT CHANGE UP (ref 150–400)
RBC # BLD: 3.64 M/UL — LOW (ref 3.8–5.2)
RBC # FLD: 13 % — SIGNIFICANT CHANGE UP (ref 10.3–14.5)
SARS-COV-2 IGG+IGM SERPL QL IA: >250 U/ML — HIGH
SARS-COV-2 IGG+IGM SERPL QL IA: POSITIVE
SARS-COV-2 RNA SPEC QL NAA+PROBE: SIGNIFICANT CHANGE UP
WBC # BLD: 6.6 K/UL — SIGNIFICANT CHANGE UP (ref 3.8–10.5)
WBC # FLD AUTO: 6.6 K/UL — SIGNIFICANT CHANGE UP (ref 3.8–10.5)

## 2023-01-18 RX ORDER — CITRIC ACID/SODIUM CITRATE 300-500 MG
30 SOLUTION, ORAL ORAL ONCE
Refills: 0 | Status: DISCONTINUED | OUTPATIENT
Start: 2023-01-18 | End: 2023-01-19

## 2023-01-18 RX ORDER — OXYTOCIN 10 UNIT/ML
2 VIAL (ML) INJECTION
Qty: 30 | Refills: 0 | Status: DISCONTINUED | OUTPATIENT
Start: 2023-01-18 | End: 2023-01-20

## 2023-01-18 RX ORDER — SODIUM CHLORIDE 9 MG/ML
1000 INJECTION, SOLUTION INTRAVENOUS
Refills: 0 | Status: DISCONTINUED | OUTPATIENT
Start: 2023-01-18 | End: 2023-01-19

## 2023-01-18 RX ORDER — BUTORPHANOL TARTRATE 2 MG/ML
2 INJECTION, SOLUTION INTRAMUSCULAR; INTRAVENOUS ONCE
Refills: 0 | Status: DISCONTINUED | OUTPATIENT
Start: 2023-01-18 | End: 2023-01-18

## 2023-01-18 RX ORDER — OXYTOCIN 10 UNIT/ML
333.33 VIAL (ML) INJECTION
Qty: 20 | Refills: 0 | Status: COMPLETED | OUTPATIENT
Start: 2023-01-18 | End: 2023-01-18

## 2023-01-18 RX ADMIN — Medication 0.25 MILLIGRAM(S): at 11:00

## 2023-01-18 RX ADMIN — BUTORPHANOL TARTRATE 2 MILLIGRAM(S): 2 INJECTION, SOLUTION INTRAMUSCULAR; INTRAVENOUS at 19:10

## 2023-01-18 RX ADMIN — SODIUM CHLORIDE 125 MILLILITER(S): 9 INJECTION, SOLUTION INTRAVENOUS at 07:42

## 2023-01-18 RX ADMIN — SODIUM CHLORIDE 125 MILLILITER(S): 9 INJECTION, SOLUTION INTRAVENOUS at 10:58

## 2023-01-18 RX ADMIN — BUTORPHANOL TARTRATE 2 MILLIGRAM(S): 2 INJECTION, SOLUTION INTRAMUSCULAR; INTRAVENOUS at 18:54

## 2023-01-18 NOTE — OB RN DELIVERY SUMMARY - NS_SEPSISRSKCALC_OBGYN_ALL_OB_FT
EOS calculated successfully. EOS Risk Factor: 0.5/1000 live births ([facility] national incidence); GA=39w1d; Temp=98.78; ROM=4.483; GBS='Unknown'; Antibiotics='No antibiotics or any antibiotics < 2 hrs prior to birth'

## 2023-01-18 NOTE — OB PROVIDER LABOR PROGRESS NOTE - ASSESSMENT
Pt admitted for induction of labor 2/2 FGR.   -VSS  -Cat 1 tracing   -Leena irregularly   -AROM, clear   -Start pitocin as per protocol     D/W Dr. Lewis

## 2023-01-18 NOTE — OB PROVIDER LABOR PROGRESS NOTE - ASSESSMENT
-VSS  -oxygen administered  -IVF bolus started  -Patient repositioned.   -prolonged decels resolved  -Will continue to monitor

## 2023-01-18 NOTE — OB PROVIDER H&P - NS_RISKASSESSMENT_OBGYN_ALL_OB
Scheduled Colonoscopy under [facility] on 10/11/2019 for Pos Cologuard/fam HX colon cancer/Francesca Trinidad. Yes, without  Social Assessment

## 2023-01-18 NOTE — OB PROVIDER H&P - NSHPPHYSICALEXAM_GEN_ALL_CORE
Vital Signs Last 24 Hrs  T(C): 36.4 (18 Jan 2023 07:23), Max: 36.4 (18 Jan 2023 07:23)  T(F): 97.52 (18 Jan 2023 07:23), Max: 97.52 (18 Jan 2023 07:23)  HR: 74 (18 Jan 2023 07:23) (74 - 78)  BP: 105/67 (18 Jan 2023 07:23) (105/67 - 121/81)  RR: 18 (18 Jan 2023 07:23) (18 - 18)  General: Alert and oriented x3, no acute distress  Cardiovascular: regular rate and rhythm, no murmurs, rubs or gallops appreciated on exam  Respiratory: clear to auscultation bilaterally  Abdominal: gravid uterus, non-tender to palpation  Pelvic: 1/0/-3  Extremities: no redness, tenderness or swelling in lower extremities bilaterally     FHT: baseline 135bpm, moderate variability, +accels, -deccels  [place]: irregular contractions q> 10 minutes Vital Signs Last 24 Hrs  T(C): 36.4 (18 Jan 2023 07:23), Max: 36.4 (18 Jan 2023 07:23)  T(F): 97.52 (18 Jan 2023 07:23), Max: 97.52 (18 Jan 2023 07:23)  HR: 74 (18 Jan 2023 07:23) (74 - 78)  BP: 105/67 (18 Jan 2023 07:23) (105/67 - 121/81)  RR: 18 (18 Jan 2023 07:23) (18 - 18)  General: Alert and oriented x3, no acute distress  Cardiovascular: regular rate and rhythm, no murmurs, rubs or gallops appreciated on exam  Respiratory: clear to auscultation bilaterally  Abdominal: gravid uterus, non-tender to palpation  Pelvic: 1/0/-3  Extremities: no redness, tenderness or swelling in lower extremities bilaterally     FHT: baseline 135bpm, moderate variability, +accels, -deccels  [place]: irregular contractions q> 10 minutes  [place]: cephalic, anterior placenta

## 2023-01-18 NOTE — OB PROVIDER H&P - HISTORY OF PRESENT ILLNESS
HORTENSIA ZAMORA is a 32y  at 39w who presented to L&D for an induction of labor for FGR 7%ile. Patient denies any regular painful contractions, leakage of fluid, vaginal bleeding, pain with urination, blood in the urine and reports good fetal movement.    Pregnancy complicated by:  1. FGR 7%ile with normal dopplers     Pregnancy complicated by:  1. FGR 7%ile, normal umbilical dopplers  2. late transfer of prenatal care    obhx:  G1: 2011 - normal spontaneous vaginal delivery uncomplicated  G2: 2021 - sab   gynhx: no history of abnormal pap smears, ovarians cysts, or history of STIs  pmhx: none  pshx: none  meds: pnv  allergies: nkda  shx: no smoking, drugs or alcohol use    GBS unknown, rubella immune

## 2023-01-18 NOTE — OB RN DELIVERY SUMMARY - NSSELHIDDEN_OBGYN_ALL_OB_FT
[NS_DeliveryAttending1_OBGYN_ALL_OB_FT:[id number]],[NS_DeliveryAssist1_OBGYN_ALL_OB_FT:[id number]],[NS_DeliveryRN_OBGYN_ALL_OB_FT:[id number]=]

## 2023-01-18 NOTE — OB PROVIDER H&P - NSLOWPPHRISK_OBGYN_A_OB
No previous uterine incision/[place] Pregnancy/Less than or equal to 4 previous vaginal births/No known bleeding disorder/No other PPH risks indicated

## 2023-01-18 NOTE — OB PROVIDER H&P - ATTENDING COMMENTS
Patient admitted at 39 weeks for induction of labor due to FGR.  Patient was informed of the need for this intervention prior to admission.    I reviewed induction process with her.    Arteaga score - unfavorable, plan for cervical ripening  FHRT reassuring  GBS unknown

## 2023-01-18 NOTE — OB PROVIDER H&P - ASSESSMENT
Patient is a 32y  at 39w who presented to L&D for an induction of labor for FGR 7%ile.     Plan    - Admission labs: CBC, type and screen, ABO confirmation, urinalysis, RPR, covid-19 pcr stat  - Pain management: none at this time  - Continuous FHT and Tocometry  - GBS prophylaxis: none required, no GBS status in her records, will obtain a GBS swab, however patient does not need treatment for GBS unknown after 37w  - vaginal cytotec

## 2023-01-19 ENCOUNTER — RESULT REVIEW (OUTPATIENT)
Age: 33
End: 2023-01-19

## 2023-01-19 ENCOUNTER — TRANSCRIPTION ENCOUNTER (OUTPATIENT)
Age: 33
End: 2023-01-19

## 2023-01-19 LAB
MEV IGG SER-ACNC: 50 AU/ML — SIGNIFICANT CHANGE UP
MEV IGG+IGM SER-IMP: POSITIVE — SIGNIFICANT CHANGE UP
T PALLIDUM AB TITR SER: NEGATIVE — SIGNIFICANT CHANGE UP

## 2023-01-19 PROCEDURE — 59400 OBSTETRICAL CARE: CPT | Mod: U9

## 2023-01-19 PROCEDURE — 88307 TISSUE EXAM BY PATHOLOGIST: CPT | Mod: 26

## 2023-01-19 RX ORDER — IBUPROFEN 200 MG
1 TABLET ORAL
Qty: 0 | Refills: 0 | DISCHARGE
Start: 2023-01-19

## 2023-01-19 RX ORDER — OXYCODONE HYDROCHLORIDE 5 MG/1
5 TABLET ORAL
Refills: 0 | Status: DISCONTINUED | OUTPATIENT
Start: 2023-01-19 | End: 2023-01-20

## 2023-01-19 RX ORDER — ACETAMINOPHEN 500 MG
3 TABLET ORAL
Qty: 0 | Refills: 0 | DISCHARGE
Start: 2023-01-19

## 2023-01-19 RX ORDER — KETOROLAC TROMETHAMINE 30 MG/ML
30 SYRINGE (ML) INJECTION ONCE
Refills: 0 | Status: DISCONTINUED | OUTPATIENT
Start: 2023-01-19 | End: 2023-01-19

## 2023-01-19 RX ORDER — SIMETHICONE 80 MG/1
80 TABLET, CHEWABLE ORAL EVERY 4 HOURS
Refills: 0 | Status: DISCONTINUED | OUTPATIENT
Start: 2023-01-19 | End: 2023-01-20

## 2023-01-19 RX ORDER — AER TRAVELER 0.5 G/1
1 SOLUTION RECTAL; TOPICAL EVERY 4 HOURS
Refills: 0 | Status: DISCONTINUED | OUTPATIENT
Start: 2023-01-19 | End: 2023-01-20

## 2023-01-19 RX ORDER — OXYCODONE HYDROCHLORIDE 5 MG/1
5 TABLET ORAL ONCE
Refills: 0 | Status: DISCONTINUED | OUTPATIENT
Start: 2023-01-19 | End: 2023-01-20

## 2023-01-19 RX ORDER — OXYTOCIN 10 UNIT/ML
41.67 VIAL (ML) INJECTION
Qty: 20 | Refills: 0 | Status: DISCONTINUED | OUTPATIENT
Start: 2023-01-19 | End: 2023-01-20

## 2023-01-19 RX ORDER — HYDROCORTISONE 1 %
1 OINTMENT (GRAM) TOPICAL EVERY 6 HOURS
Refills: 0 | Status: DISCONTINUED | OUTPATIENT
Start: 2023-01-19 | End: 2023-01-20

## 2023-01-19 RX ORDER — IBUPROFEN 200 MG
600 TABLET ORAL EVERY 6 HOURS
Refills: 0 | Status: DISCONTINUED | OUTPATIENT
Start: 2023-01-19 | End: 2023-01-20

## 2023-01-19 RX ORDER — TETANUS TOXOID, REDUCED DIPHTHERIA TOXOID AND ACELLULAR PERTUSSIS VACCINE, ADSORBED 5; 2.5; 8; 8; 2.5 [IU]/.5ML; [IU]/.5ML; UG/.5ML; UG/.5ML; UG/.5ML
0.5 SUSPENSION INTRAMUSCULAR ONCE
Refills: 0 | Status: DISCONTINUED | OUTPATIENT
Start: 2023-01-19 | End: 2023-01-20

## 2023-01-19 RX ORDER — PRAMOXINE HYDROCHLORIDE 150 MG/15G
1 AEROSOL, FOAM RECTAL EVERY 4 HOURS
Refills: 0 | Status: DISCONTINUED | OUTPATIENT
Start: 2023-01-19 | End: 2023-01-20

## 2023-01-19 RX ORDER — MAGNESIUM HYDROXIDE 400 MG/1
30 TABLET, CHEWABLE ORAL
Refills: 0 | Status: DISCONTINUED | OUTPATIENT
Start: 2023-01-19 | End: 2023-01-20

## 2023-01-19 RX ORDER — LANOLIN
1 OINTMENT (GRAM) TOPICAL EVERY 6 HOURS
Refills: 0 | Status: DISCONTINUED | OUTPATIENT
Start: 2023-01-19 | End: 2023-01-20

## 2023-01-19 RX ORDER — ACETAMINOPHEN 500 MG
975 TABLET ORAL
Refills: 0 | Status: DISCONTINUED | OUTPATIENT
Start: 2023-01-19 | End: 2023-01-20

## 2023-01-19 RX ORDER — DIPHENHYDRAMINE HCL 50 MG
25 CAPSULE ORAL EVERY 6 HOURS
Refills: 0 | Status: DISCONTINUED | OUTPATIENT
Start: 2023-01-19 | End: 2023-01-20

## 2023-01-19 RX ORDER — SODIUM CHLORIDE 9 MG/ML
3 INJECTION INTRAMUSCULAR; INTRAVENOUS; SUBCUTANEOUS EVERY 8 HOURS
Refills: 0 | Status: DISCONTINUED | OUTPATIENT
Start: 2023-01-19 | End: 2023-01-20

## 2023-01-19 RX ORDER — DIBUCAINE 1 %
1 OINTMENT (GRAM) RECTAL EVERY 6 HOURS
Refills: 0 | Status: DISCONTINUED | OUTPATIENT
Start: 2023-01-19 | End: 2023-01-20

## 2023-01-19 RX ORDER — IBUPROFEN 200 MG
600 TABLET ORAL EVERY 6 HOURS
Refills: 0 | Status: COMPLETED | OUTPATIENT
Start: 2023-01-19 | End: 2023-12-18

## 2023-01-19 RX ORDER — BENZOCAINE 10 %
1 GEL (GRAM) MUCOUS MEMBRANE EVERY 6 HOURS
Refills: 0 | Status: DISCONTINUED | OUTPATIENT
Start: 2023-01-19 | End: 2023-01-20

## 2023-01-19 RX ADMIN — Medication 600 MILLIGRAM(S): at 17:59

## 2023-01-19 RX ADMIN — Medication 600 MILLIGRAM(S): at 11:55

## 2023-01-19 RX ADMIN — SODIUM CHLORIDE 3 MILLILITER(S): 9 INJECTION INTRAMUSCULAR; INTRAVENOUS; SUBCUTANEOUS at 05:10

## 2023-01-19 RX ADMIN — Medication 600 MILLIGRAM(S): at 23:14

## 2023-01-19 RX ADMIN — Medication 1000 MILLIUNIT(S)/MIN: at 03:00

## 2023-01-19 RX ADMIN — Medication 975 MILLIGRAM(S): at 09:00

## 2023-01-19 RX ADMIN — Medication 125 MILLIUNIT(S)/MIN: at 03:50

## 2023-01-19 RX ADMIN — Medication 975 MILLIGRAM(S): at 15:18

## 2023-01-19 RX ADMIN — Medication 30 MILLIGRAM(S): at 03:50

## 2023-01-19 RX ADMIN — Medication 975 MILLIGRAM(S): at 20:51

## 2023-01-19 NOTE — OB PROVIDER LABOR PROGRESS NOTE - ASSESSMENT
-VSS  -Will continue to to reposition patient  -Amnioinfusion in place  -Will continue to monitor and reassess as needed

## 2023-01-19 NOTE — DISCHARGE NOTE OB - PATIENT PORTAL LINK FT
You can access the FollowMyHealth Patient Portal offered by [facility] by registering at the following website: [email or web address]. By joining [facility] FollowMyHealth portal, you will also be able to view your health information using other applications (apps) compatible with our system.

## 2023-01-19 NOTE — DISCHARGE NOTE OB - CARE PROVIDER_API CALL
Ting Vergara (DO)  Obstetrics and Gynecology  [address], [facility] [address] [place] [address]  Phone: [phone]  Fax: [phone]  Established Patient  Follow Up Time: 2 weeks

## 2023-01-19 NOTE — OB PROVIDER DELIVERY SUMMARY - NSPROVIDERDELIVERYNOTE_OBGYN_ALL_OB_FT
at 2:30AM of a live male, 3470 gm and Apgars 8/9. Delivered BELLA, nuchal cordx1 reduced, clear fluid. Infant's head delivered with maternal expulsive efforts. Shoulders delivered without difficulty followed by the rest of the body. Nose and mouth were bulb suctioned. Baby handed to patient. Cord clamped and cut after delay. Samples obtained. Placenta delivered spontaneously, intact, 3VC. Fundus firm, minimal bleeding. Perineum and vagina inspected, no lacerations present. EBL 152cc. Hemostasis noted. Pt tolerated procedure well, in stable condition, recovering in LDR. Infant in LDR. Instrument/sponge count correct x 2 and confirmed by nurse.  at 2:30AM of a live male, 3470 gm and Apgars 8/9. Delivered BELLA, nuchal cordx1 reduced, clear fluid. Infant's head delivered with maternal expulsive efforts. Shoulders delivered without difficulty followed by the rest of the body. Nose and mouth were bulb suctioned. Baby handed to patient. Cord clamped and cut after delay. Samples obtained. Placenta delivered spontaneously, intact, 3VC. Fundus firm, minimal bleeding. Perineum and vagina inspected, no lacerations present. EBL 152cc. Hemostasis noted. Pt tolerated procedure well, in stable condition, recovering in LDR. Infant in LDR. Instrument/sponge count correct x 2 and confirmed by nurse.    Attending Addendum:   I was present for delivery and agree with above.     Shaniqua Gaming MD

## 2023-01-19 NOTE — OB PROVIDER LABOR PROGRESS NOTE - NS_SUBJECTIVE/OBJECTIVE_OBGYN_ALL_OB_FT
Patient is having iol for FGR  FHR: baseline 150bpm, mod variability, +accels, -deccels  [place]: contractions q1-2 minutes    Plan  - cervical balloon still in place, will continue to monitor FHR and recheck in 4-6 hours
Patient is having iol for FGR 7%ile  FHR: baseline 120bpm, mod variability, +accels, -deccels  [place]: contractions q5-10 minutes    Plan  - 25 mcg vaginal cytotec placed without difficulty
Patient is having iol for FGR 7%ile  FHR: baseline 150bpm, mod variability, +accels, -deccels  [place]: contractions q1-2 minutes    a/p  Patient is s/p terbutaline and removal of cytotec at 1100  - balloon placed 60/60  - will consider pitocin if contractions become less frequent
Patient re-examined at bedside. Assessed for prolonged 3 minute decel
Patient re-examined at bedside. Reports feeling pressure
Patient re-examined at bedside. Reports increased pain with contractions
Patient seen and examined at bedside.
patient uncomfortable  balloon out

## 2023-01-19 NOTE — OB PROVIDER DELIVERY SUMMARY - NSSELHIDDEN_OBGYN_ALL_OB_FT
[NS_DeliveryAttending1_OBGYN_ALL_OB_FT:[id number]],[NS_DeliveryAssist1_OBGYN_ALL_OB_FT:[id number]],[NS_DeliveryAssist2_OBGYN_ALL_OB_FT:[id number]=]

## 2023-01-19 NOTE — DISCHARGE NOTE OB - NS MD DC FALL RISK RISK
For information on Fall & Injury Prevention, visit: [email or web address] OR  [email or web address] OR  https://www.cdc.gov/steadi/patient.html

## 2023-01-20 ENCOUNTER — APPOINTMENT (OUTPATIENT)
Dept: ANTEPARTUM | Facility: CLINIC | Age: 33
End: 2023-01-20

## 2023-01-20 VITALS
TEMPERATURE: 98 F | OXYGEN SATURATION: 97 % | RESPIRATION RATE: 18 BRPM | DIASTOLIC BLOOD PRESSURE: 65 MMHG | HEART RATE: 71 BPM | SYSTOLIC BLOOD PRESSURE: 102 MMHG

## 2023-01-20 DIAGNOSIS — O99.013 ANEMIA COMPLICATING PREGNANCY, THIRD TRIMESTER: ICD-10-CM

## 2023-01-20 LAB
CULTURE RESULTS: SIGNIFICANT CHANGE UP
HCT VFR BLD CALC: 26.4 % — LOW (ref 34.5–45)
HGB BLD-MCNC: 8.9 G/DL — LOW (ref 11.5–15.5)
SPECIMEN SOURCE: SIGNIFICANT CHANGE UP

## 2023-01-20 PROCEDURE — 86850 RBC ANTIBODY SCREEN: CPT

## 2023-01-20 PROCEDURE — 88307 TISSUE EXAM BY PATHOLOGIST: CPT

## 2023-01-20 PROCEDURE — 86901 BLOOD TYPING SEROLOGIC RH(D): CPT

## 2023-01-20 PROCEDURE — 86765 RUBEOLA ANTIBODY: CPT

## 2023-01-20 PROCEDURE — 86769 SARS-COV-2 COVID-19 ANTIBODY: CPT

## 2023-01-20 PROCEDURE — 86703 HIV-1/HIV-2 1 RESULT ANTBDY: CPT

## 2023-01-20 PROCEDURE — 85025 COMPLETE CBC W/AUTO DIFF WBC: CPT

## 2023-01-20 PROCEDURE — U0005: CPT

## 2023-01-20 PROCEDURE — 86900 BLOOD TYPING SEROLOGIC ABO: CPT

## 2023-01-20 PROCEDURE — 85014 HEMATOCRIT: CPT

## 2023-01-20 PROCEDURE — U0003: CPT

## 2023-01-20 PROCEDURE — 86780 TREPONEMA PALLIDUM: CPT

## 2023-01-20 PROCEDURE — 87389 HIV-1 AG W/HIV-1&-2 AB AG IA: CPT

## 2023-01-20 PROCEDURE — 85018 HEMOGLOBIN: CPT

## 2023-01-20 PROCEDURE — 36415 COLL VENOUS BLD VENIPUNCTURE: CPT

## 2023-01-20 PROCEDURE — 87070 CULTURE OTHR SPECIMN AEROBIC: CPT

## 2023-01-20 RX ADMIN — Medication 1 TABLET(S): at 11:34

## 2023-01-20 RX ADMIN — Medication 975 MILLIGRAM(S): at 08:15

## 2023-01-20 RX ADMIN — Medication 600 MILLIGRAM(S): at 05:38

## 2023-01-20 RX ADMIN — Medication 600 MILLIGRAM(S): at 11:34

## 2023-01-20 NOTE — PROGRESS NOTE ADULT - ASSESSMENT
ASSESSMENT  HORTENSIA ZAMORA is a 32y  who is post-partum day#1  who delivered a male infant at 39w1d GA by vaginal delivery after an induction of labor for FGR.  No acute events.     PLAN  1. Routine post-partum care  - Continue to encourage ambulation, PO intake and breastfeeding  - DVT prophylaxis SCDs and ambulation  - Continue pain management PRN.   - Plan to discharge per attending approval

## 2023-01-20 NOTE — PROGRESS NOTE ADULT - SUBJECTIVE AND OBJECTIVE BOX
Vaginal Delivery Progress Note    HORTENSIA ZAMORA is a 32y  who is post-partum day#1  who delivered a male infant at 39w1d GA by vaginal delivery after an induction of labor for FGR.     SUBJECTIVE:  No acute events overnight. Pain is well controlled with PRN pain medication. No problems with ambulating, voiding, or PO intake. Has had flatus but no BM. Denies nausea and vomiting. Patient is having normal lochia, which is decreasing.      OBJECTIVE:  Physical exam:  Vital Signs Last 24 Hrs  T(C): 36.9 (2023 04:27), Max: 37.1 (2023 15:27)  T(F): 98.4 (2023 04:27), Max: 98.8 (2023 15:27)  HR: 71 (2023 04:27) (71 - 92)  BP: 102/65 (2023 04:27) (101/66 - 111/73)  RR: 18 (2023 04:27) (18 - 18)  SpO2: 97% (2023 04:27) (97% - 100%)    Parameters below as of 2023 04:27  Patient On (Oxygen Delivery Method): room air      General: alert and oriented x3, no acute distress.  Heart: regular rate and rhythm, no murmurs, rubs or gallops appreciated.  Lungs: clear to auscultation bilaterally.   Abdomen: Soft, appropriately tender to palpitation, firm uterine fundus at umbilicus.  Extremities: no tenderness, redness or swelling in lower extremities bilaterally.     Labs:                         8.9    x     )-----------( x        ( 2023 05:17 )             26.4

## 2023-01-20 NOTE — CHART NOTE - NSCHARTNOTEFT_GEN_A_CORE
[employer] [facility] Note:    Initial Screening    Date of Initial Screenin23    Patient qualifies for [employer] Program  [ x ] Patient qualifies for [employer] Program w/ health condition  [  ] Patient does not qualify for [employer] Program w/ no health conditions    Diagnosis that qualifies patient for program  [  ] Hypertension  [  ] Diabetes  [  ] Unintended weight loss  [  ] Obesity  [  ] CHF  [x  ] Other    Additional Comments: postpartum mother          Current Patient Diet: Regular Diet      Actions Taken:  [ x ] Spoke with patient  [ x ] [employer] survey completed  Additional Comments: Patient provided with community resources through [employer]. Nutrition education completed on healthy postpartum diet.          Non-qualification for [employer] Program  [   ] D/C to [facility]  [   ] Referred to Social Work  [  ] Declined [employer] [facility]  [   ] D/C Before Seen By RD  Participant Phone Number:  ELIDA Comments:              Discharge Visit  [ x ] Initial Screening already completed    Date of D/C:  [ x ]  Patient provided with healthy groceries  [ x ] Follow Up appointment made  [ x ] Other community outreach given  [  ] Help with SNAP application at F/U appointment  [  ] Patient D/C while RD was unavailable. Will call to schedule pick-up

## 2023-01-24 ENCOUNTER — APPOINTMENT (OUTPATIENT)
Dept: ANTEPARTUM | Facility: CLINIC | Age: 33
End: 2023-01-24

## 2023-01-30 ENCOUNTER — APPOINTMENT (OUTPATIENT)
Dept: OBGYN | Facility: CLINIC | Age: 33
End: 2023-01-30
Payer: MEDICAID

## 2023-01-30 VITALS
HEIGHT: 59 IN | DIASTOLIC BLOOD PRESSURE: 60 MMHG | BODY MASS INDEX: 23.79 KG/M2 | SYSTOLIC BLOOD PRESSURE: 110 MMHG | WEIGHT: 118 LBS

## 2023-01-30 DIAGNOSIS — R39.9 UNSPECIFIED SYMPTOMS AND SIGNS INVOLVING THE GENITOURINARY SYSTEM: ICD-10-CM

## 2023-01-30 LAB — SURGICAL PATHOLOGY STUDY: SIGNIFICANT CHANGE UP

## 2023-01-30 PROCEDURE — 99213 OFFICE O/P EST LOW 20 MIN: CPT

## 2023-01-30 RX ORDER — SULFAMETHOXAZOLE AND TRIMETHOPRIM 800; 160 MG/1; MG/1
800-160 TABLET ORAL TWICE DAILY
Qty: 14 | Refills: 1 | Status: DISCONTINUED | COMMUNITY
Start: 2023-01-30 | End: 2023-01-30

## 2023-01-30 NOTE — PHYSICAL EXAM
[Soft] : soft [Non-tender] : non-tender [Labia Minora] : normal [Labia Majora] : normal [Normal] : normal [Tenderness] : nontender [Enlarged ___ wks] : enlarged [unfilled] ~Uweeks [Uterine Adnexae] : normal

## 2023-01-30 NOTE — HISTORY OF PRESENT ILLNESS
[FreeTextEntry1] : 32-year-old  female status post  on 2023 here with complaint of lower abdominal pain.  She has not resumed sex.  She denies heavy bleeding.  She has taken some Tylenol with some relief.  She denies any GI or  symptoms.  Patient is breast-feeding and supplementing.  She denies any fevers or chills.

## 2023-01-30 NOTE — DISCUSSION/SUMMARY
[FreeTextEntry1] : Delivery note revealed no laceration 150 cc blood loss.  Patient has no abdominal or uterine tenderness.  I reassured her and advised her to take some Motrin or ibuprofen for her discomfort.  If her pain is worse she will advise us.

## 2023-02-28 NOTE — OB PROVIDER IHI INDUCTION/AUGMENTATION NOTE - NS_CHECKALL_OBGYN_ALL_OB
room air
Order was written/H&P was completed/Contractions pattern was reviewed/FHR was reviewed/Induction / Augmentation was discussed
Order was written/H&P was completed/Contractions pattern was reviewed/FHR was reviewed/Induction / Augmentation was discussed

## 2023-03-09 ENCOUNTER — APPOINTMENT (OUTPATIENT)
Dept: OBGYN | Facility: CLINIC | Age: 33
End: 2023-03-09
Payer: MEDICAID

## 2023-03-09 VITALS
HEIGHT: 59 IN | WEIGHT: 121 LBS | BODY MASS INDEX: 24.39 KG/M2 | SYSTOLIC BLOOD PRESSURE: 114 MMHG | DIASTOLIC BLOOD PRESSURE: 70 MMHG

## 2023-03-09 DIAGNOSIS — N89.8 OTHER SPECIFIED NONINFLAMMATORY DISORDERS OF VAGINA: ICD-10-CM

## 2023-03-09 RX ORDER — METRONIDAZOLE 7.5 MG/G
0.75 GEL VAGINAL
Qty: 1 | Refills: 1 | Status: ACTIVE | COMMUNITY
Start: 2023-03-09 | End: 1900-01-01

## 2023-03-09 NOTE — HISTORY OF PRESENT ILLNESS
[Postpartum Follow Up] : postpartum follow up [Delivery Date: ___] : on [unfilled] [] : delivered by vaginal delivery [Breastfeeding] : currently nursing [S/Sx PP Depression] : no signs/symptoms of postpartum depression [None] : No associated symptoms are reported [Back to Normal] : is back to normal in size [Doing Well] : is doing well [de-identified] : c/o vaginal discharge; no itching; no malodor [de-identified] : Prescribed progesterone only pills for birth control; MetroGel given for vaginal discharge

## 2023-07-06 ENCOUNTER — APPOINTMENT (OUTPATIENT)
Dept: OBGYN | Facility: CLINIC | Age: 33
End: 2023-07-06
Payer: MEDICAID

## 2023-07-06 ENCOUNTER — APPOINTMENT (OUTPATIENT)
Dept: OBGYN | Facility: CLINIC | Age: 33
End: 2023-07-06

## 2023-07-06 VITALS
DIASTOLIC BLOOD PRESSURE: 80 MMHG | HEIGHT: 59 IN | HEART RATE: 92 BPM | WEIGHT: 132 LBS | BODY MASS INDEX: 26.61 KG/M2 | SYSTOLIC BLOOD PRESSURE: 118 MMHG

## 2023-07-06 DIAGNOSIS — R53.83 OTHER FATIGUE: ICD-10-CM

## 2023-07-06 DIAGNOSIS — M79.605 PAIN IN RIGHT LEG: ICD-10-CM

## 2023-07-06 DIAGNOSIS — M79.604 PAIN IN RIGHT LEG: ICD-10-CM

## 2023-07-06 DIAGNOSIS — Z01.419 ENCOUNTER FOR GYNECOLOGICAL EXAMINATION (GENERAL) (ROUTINE) W/OUT ABNORMAL FINDINGS: ICD-10-CM

## 2023-07-06 DIAGNOSIS — R10.2 PELVIC AND PERINEAL PAIN: ICD-10-CM

## 2023-07-06 LAB
HCG UR QL: NEGATIVE
QUALITY CONTROL: YES

## 2023-07-06 PROCEDURE — 81025 URINE PREGNANCY TEST: CPT

## 2023-07-06 PROCEDURE — 99385 PREV VISIT NEW AGE 18-39: CPT

## 2023-07-06 RX ORDER — NORETHINDRONE 0.35 MG/1
0.35 TABLET ORAL DAILY
Qty: 3 | Refills: 1 | Status: ACTIVE | COMMUNITY
Start: 2023-03-09 | End: 1900-01-01

## 2023-07-06 NOTE — DISCUSSION/SUMMARY
[FreeTextEntry1] : encouraged pt to exercise 2.5 hours minimally a week as per the [employer] , to monitor her cycles, to take calcium foods equalling 1000 mg daily and vitamin D 1000 IU daily and fish oil or flax seed oil for omega 3s. REturn in one yr for a visit. questions answered. Encouraged SBE\par disc progesterone only for bc with nursing. \par

## 2023-07-08 LAB — HPV HIGH+LOW RISK DNA PNL CVX: NOT DETECTED

## 2023-07-10 LAB — CYTOLOGY CVX/VAG DOC THIN PREP: NORMAL

## 2023-11-09 NOTE — DISCHARGE NOTE OB - BREAST MILK PROVIDES COLOSTRUM THAT IS HIGH IN PROTEIN
Statement Selected O-Z Plasty Text: The defect edges were debeveled with a #15 scalpel blade. Given the location of the defect, shape of the defect and the proximity to free margins an O-Z plasty (double transposition flap) was deemed most appropriate. Using a sterile surgical marker, the appropriate transposition flaps were drawn incorporating the defect and placing the expected incisions within the relaxed skin tension lines where possible. The area thus outlined was incised deep to adipose tissue with a #15 scalpel blade. The skin margins were undermined to an appropriate distance in all directions utilizing iris scissors. Hemostasis was achieved with electrocautery. The flaps were then transposed and carried over into place, one clockwise and the other counterclockwise, and anchored with interrupted buried subcutaneous sutures.

## 2024-09-17 NOTE — DISCHARGE NOTE OB - HOSPITAL COURSE
Detail Level: Detailed
delivered via spontaneous vaginal delivery. She was transferred to postpartum unit without complications during her stay. Upon discharge she is voiding, tolerating PO, ambulating, and pain is controlled.

## 2024-10-18 ENCOUNTER — APPOINTMENT (OUTPATIENT)
Dept: OBGYN | Facility: CLINIC | Age: 34
End: 2024-10-18

## 2024-11-01 ENCOUNTER — APPOINTMENT (OUTPATIENT)
Dept: OBGYN | Facility: CLINIC | Age: 34
End: 2024-11-01
Payer: COMMERCIAL

## 2024-11-01 VITALS
HEIGHT: 59 IN | BODY MASS INDEX: 25 KG/M2 | SYSTOLIC BLOOD PRESSURE: 110 MMHG | DIASTOLIC BLOOD PRESSURE: 76 MMHG | WEIGHT: 124 LBS

## 2024-11-01 DIAGNOSIS — N91.2 AMENORRHEA, UNSPECIFIED: ICD-10-CM

## 2024-11-01 DIAGNOSIS — Z01.419 ENCOUNTER FOR GYNECOLOGICAL EXAMINATION (GENERAL) (ROUTINE) W/OUT ABNORMAL FINDINGS: ICD-10-CM

## 2024-11-01 DIAGNOSIS — Z83.42 FAMILY HISTORY OF FAMILIAL HYPERCHOLESTEROLEMIA: ICD-10-CM

## 2024-11-01 DIAGNOSIS — Z78.9 OTHER SPECIFIED HEALTH STATUS: ICD-10-CM

## 2024-11-01 DIAGNOSIS — N91.5 OLIGOMENORRHEA, UNSPECIFIED: ICD-10-CM

## 2024-11-01 DIAGNOSIS — Z83.3 FAMILY HISTORY OF DIABETES MELLITUS: ICD-10-CM

## 2024-11-01 LAB
HCG UR QL: POSITIVE
QUALITY CONTROL: YES

## 2024-11-01 PROCEDURE — 81025 URINE PREGNANCY TEST: CPT

## 2024-11-01 PROCEDURE — 99395 PREV VISIT EST AGE 18-39: CPT

## 2024-11-01 PROCEDURE — 99212 OFFICE O/P EST SF 10 MIN: CPT | Mod: 25

## 2024-11-01 PROCEDURE — 99459 PELVIC EXAMINATION: CPT

## 2024-11-04 LAB — HPV HIGH+LOW RISK DNA PNL CVX: NOT DETECTED

## 2024-11-05 ENCOUNTER — APPOINTMENT (OUTPATIENT)
Dept: ANTEPARTUM | Facility: CLINIC | Age: 34
End: 2024-11-05
Payer: COMMERCIAL

## 2024-11-05 ENCOUNTER — ASOB RESULT (OUTPATIENT)
Age: 34
End: 2024-11-05

## 2024-11-05 PROCEDURE — 76815 OB US LIMITED FETUS(S): CPT

## 2024-11-06 ENCOUNTER — APPOINTMENT (OUTPATIENT)
Dept: INTERNAL MEDICINE | Facility: CLINIC | Age: 34
End: 2024-11-06

## 2024-11-06 LAB — CYTOLOGY CVX/VAG DOC THIN PREP: NORMAL

## 2024-11-13 ENCOUNTER — LABORATORY RESULT (OUTPATIENT)
Age: 34
End: 2024-11-13

## 2024-11-14 ENCOUNTER — NON-APPOINTMENT (OUTPATIENT)
Age: 34
End: 2024-11-14

## 2024-11-14 ENCOUNTER — APPOINTMENT (OUTPATIENT)
Dept: OBGYN | Facility: CLINIC | Age: 34
End: 2024-11-14
Payer: COMMERCIAL

## 2024-11-14 VITALS
HEIGHT: 59 IN | DIASTOLIC BLOOD PRESSURE: 62 MMHG | BODY MASS INDEX: 25.24 KG/M2 | SYSTOLIC BLOOD PRESSURE: 100 MMHG | WEIGHT: 125.19 LBS

## 2024-11-14 DIAGNOSIS — Z34.92 ENCOUNTER FOR SUPERVISION OF NORMAL PREGNANCY, UNSPECIFIED, SECOND TRIMESTER: ICD-10-CM

## 2024-11-14 LAB
APPEARANCE: CLEAR
BILIRUBIN URINE: NEGATIVE
BLOOD URINE: NEGATIVE
COLOR: YELLOW
GLUCOSE QUALITATIVE U: NEGATIVE
KETONES URINE: NEGATIVE
LEUKOCYTE ESTERASE URINE: ABNORMAL
NITRITE URINE: NEGATIVE
PH URINE: 7.5
PROTEIN URINE: ABNORMAL
SPECIFIC GRAVITY URINE: 1.02
UROBILINOGEN URINE: 0.2 (ref 0.2–?)

## 2024-11-14 PROCEDURE — 81003 URINALYSIS AUTO W/O SCOPE: CPT | Mod: NC,QW

## 2024-11-14 PROCEDURE — 0500F INITIAL PRENATAL CARE VISIT: CPT

## 2024-11-15 ENCOUNTER — LABORATORY RESULT (OUTPATIENT)
Age: 34
End: 2024-11-15

## 2024-11-15 LAB
ABO + RH PNL BLD: NORMAL
AFP INTERP SERPL-IMP: NORMAL
AFP INTERP SERPL-IMP: NORMAL
AFP MOM CUT-OFF: 2.5
AFP MOM SERPL: 0.73
AFP PERCENTILE: 16.8
AFP SERPL-ACNC: 40.47 NG/ML
ALBUMIN SERPL ELPH-MCNC: 3.8 G/DL
ALP BLD-CCNC: 84 U/L
ALT SERPL-CCNC: 16 U/L
ANION GAP SERPL CALC-SCNC: 14 MMOL/L
APPEARANCE: ABNORMAL
AST SERPL-CCNC: 20 U/L
BILIRUB SERPL-MCNC: 0.2 MG/DL
BILIRUBIN URINE: NEGATIVE
BLD GP AB SCN SERPL QL: NORMAL
BLOOD URINE: NEGATIVE
BUN SERPL-MCNC: 6 MG/DL
CALCIUM SERPL-MCNC: 8.8 MG/DL
CARBAMAZEPINE?: NO
CHLORIDE SERPL-SCNC: 101 MMOL/L
CO2 SERPL-SCNC: 21 MMOL/L
COLOR: YELLOW
CREAT SERPL-MCNC: 0.55 MG/DL
CURRENT SMOKER: NO
DIABETES STATUS PATIENT: NO
EGFR: 122 ML/MIN/1.73M2
ESTIMATED AVERAGE GLUCOSE: 94 MG/DL
GA: NORMAL
GESTATIONAL AGE METHOD: NORMAL
GLUCOSE QUALITATIVE U: NEGATIVE MG/DL
GLUCOSE SERPL-MCNC: 38 MG/DL
HBA1C MFR BLD HPLC: 4.9 %
HBV SURFACE AG SER QL: NONREACTIVE
HCT VFR BLD CALC: 35.2 %
HCV AB SER QL: NONREACTIVE
HCV S/CO RATIO: 0.06 S/CO
HGB A MFR BLD: 97.3 %
HGB A2 MFR BLD: 2.7 %
HGB BLD-MCNC: 12 G/DL
HGB FRACT BLD-IMP: NORMAL
HIV1+2 AB SPEC QL IA.RAPID: NONREACTIVE
HX OF NTD NARR: NO
KETONES URINE: NEGATIVE MG/DL
LEAD BLD-MCNC: <1 UG/DL
LEUKOCYTE ESTERASE URINE: ABNORMAL
MCHC RBC-ENTMCNC: 31.5 PG
MCHC RBC-ENTMCNC: 34.1 G/DL
MCV RBC AUTO: 92.4 FL
MEV IGG FLD QL IA: 90.1 AU/ML
MEV IGG+IGM SER-IMP: POSITIVE
MULTIPLE PREGNANCY: NORMAL
MUV AB SER-ACNC: NEGATIVE
MUV IGG SER QL IA: <5 AU/ML
NEURAL TUBE DEFECT RISK FETUS: NORMAL
NEURAL TUBE DEFECT RISK POP: NORMAL
NITRITE URINE: NEGATIVE
PH URINE: 8
PLATELET # BLD AUTO: 244 K/UL
POTASSIUM SERPL-SCNC: 4.8 MMOL/L
PROT SERPL-MCNC: 6.3 G/DL
PROTEIN URINE: NORMAL MG/DL
RBC # BLD: 3.81 M/UL
RBC # FLD: 14.4 %
RECOM F/U: NO
RUBV IGG FLD-ACNC: 16.4 INDEX
RUBV IGG SER-IMP: POSITIVE
SODIUM SERPL-SCNC: 136 MMOL/L
SPECIFIC GRAVITY URINE: 1.02
T PALLIDUM AB SER QL IA: NEGATIVE
TEST PERFORMANCE INFO SPEC: NORMAL
TSH SERPL-ACNC: 1.8 UIU/ML
UROBILINOGEN URINE: 0.2 MG/DL
VALPROIC ACID?: NO
VZV AB TITR SER: POSITIVE
VZV IGG SER IF-ACNC: 9.45 S/CO
WBC # FLD AUTO: 9.98 K/UL

## 2024-11-26 ENCOUNTER — APPOINTMENT (OUTPATIENT)
Dept: ANTEPARTUM | Facility: CLINIC | Age: 34
End: 2024-11-26
Payer: COMMERCIAL

## 2024-11-26 ENCOUNTER — ASOB RESULT (OUTPATIENT)
Age: 34
End: 2024-11-26

## 2024-11-26 ENCOUNTER — NON-APPOINTMENT (OUTPATIENT)
Age: 34
End: 2024-11-26

## 2024-11-26 PROCEDURE — 76811 OB US DETAILED SNGL FETUS: CPT

## 2024-11-26 PROCEDURE — 76817 TRANSVAGINAL US OBSTETRIC: CPT

## 2024-12-09 ENCOUNTER — APPOINTMENT (OUTPATIENT)
Dept: OBGYN | Facility: CLINIC | Age: 34
End: 2024-12-09

## 2024-12-30 DIAGNOSIS — Z34.92 ENCOUNTER FOR SUPERVISION OF NORMAL PREGNANCY, UNSPECIFIED, SECOND TRIMESTER: ICD-10-CM

## 2025-01-08 ENCOUNTER — APPOINTMENT (OUTPATIENT)
Dept: OBGYN | Facility: CLINIC | Age: 35
End: 2025-01-08
Payer: COMMERCIAL

## 2025-01-08 VITALS
BODY MASS INDEX: 28.24 KG/M2 | DIASTOLIC BLOOD PRESSURE: 60 MMHG | HEIGHT: 59 IN | SYSTOLIC BLOOD PRESSURE: 120 MMHG | WEIGHT: 140.06 LBS

## 2025-01-08 PROCEDURE — 0502F SUBSEQUENT PRENATAL CARE: CPT

## 2025-01-08 PROCEDURE — 81003 URINALYSIS AUTO W/O SCOPE: CPT | Mod: NC,QW

## 2025-01-09 LAB
APPEARANCE: CLEAR
BILIRUBIN URINE: NEGATIVE
BLOOD URINE: NEGATIVE
COLOR: YELLOW
GLUCOSE 1H P 50 G GLC PO SERPL-MCNC: 104 MG/DL
GLUCOSE QUALITATIVE U: NEGATIVE
HCT VFR BLD CALC: 36.5 %
HGB BLD-MCNC: 12 G/DL
KETONES URINE: NEGATIVE
LEUKOCYTE ESTERASE URINE: NEGATIVE
MCHC RBC-ENTMCNC: 32.4 PG
MCHC RBC-ENTMCNC: 32.9 G/DL
MCV RBC AUTO: 98.6 FL
NITRITE URINE: NEGATIVE
PH URINE: 5.5
PLATELET # BLD AUTO: 218 K/UL
PROTEIN URINE: NEGATIVE
RBC # BLD: 3.7 M/UL
RBC # FLD: 13.3 %
SPECIFIC GRAVITY URINE: 1.02
UROBILINOGEN URINE: 0.2 (ref 0.2–?)
WBC # FLD AUTO: 9.96 K/UL

## 2025-01-10 LAB — T PALLIDUM AB SER QL IA: NEGATIVE

## 2025-01-28 ENCOUNTER — ASOB RESULT (OUTPATIENT)
Age: 35
End: 2025-01-28

## 2025-01-28 ENCOUNTER — APPOINTMENT (OUTPATIENT)
Dept: ANTEPARTUM | Facility: CLINIC | Age: 35
End: 2025-01-28
Payer: COMMERCIAL

## 2025-01-28 ENCOUNTER — APPOINTMENT (OUTPATIENT)
Dept: OBGYN | Facility: CLINIC | Age: 35
End: 2025-01-28
Payer: COMMERCIAL

## 2025-01-28 ENCOUNTER — NON-APPOINTMENT (OUTPATIENT)
Age: 35
End: 2025-01-28

## 2025-01-28 VITALS
WEIGHT: 143.25 LBS | HEIGHT: 59 IN | DIASTOLIC BLOOD PRESSURE: 70 MMHG | SYSTOLIC BLOOD PRESSURE: 110 MMHG | BODY MASS INDEX: 28.88 KG/M2

## 2025-01-28 LAB
APPEARANCE: CLEAR
BILIRUBIN URINE: NEGATIVE
BLOOD URINE: NEGATIVE
COLOR: YELLOW
GLUCOSE QUALITATIVE U: NEGATIVE
KETONES URINE: NEGATIVE
LEUKOCYTE ESTERASE URINE: NEGATIVE
NITRITE URINE: NEGATIVE
PH URINE: 6
PROTEIN URINE: NEGATIVE
SPECIFIC GRAVITY URINE: 1.01
UROBILINOGEN URINE: 0.2 (ref 0.2–?)

## 2025-01-28 PROCEDURE — 76816 OB US FOLLOW-UP PER FETUS: CPT

## 2025-01-28 PROCEDURE — 81003 URINALYSIS AUTO W/O SCOPE: CPT | Mod: QW

## 2025-01-28 PROCEDURE — 0502F SUBSEQUENT PRENATAL CARE: CPT

## 2025-02-03 ENCOUNTER — NON-APPOINTMENT (OUTPATIENT)
Age: 35
End: 2025-02-03

## 2025-02-18 ENCOUNTER — APPOINTMENT (OUTPATIENT)
Dept: OBGYN | Facility: CLINIC | Age: 35
End: 2025-02-18
Payer: COMMERCIAL

## 2025-02-18 ENCOUNTER — APPOINTMENT (OUTPATIENT)
Dept: ANTEPARTUM | Facility: CLINIC | Age: 35
End: 2025-02-18

## 2025-02-18 VITALS
HEIGHT: 59 IN | WEIGHT: 149 LBS | SYSTOLIC BLOOD PRESSURE: 100 MMHG | BODY MASS INDEX: 30.04 KG/M2 | DIASTOLIC BLOOD PRESSURE: 60 MMHG

## 2025-02-18 LAB
APPEARANCE: CLEAR
BILIRUBIN URINE: NEGATIVE
BLOOD URINE: NEGATIVE
COLOR: YELLOW
GLUCOSE QUALITATIVE U: NEGATIVE
KETONES URINE: NEGATIVE
LEUKOCYTE ESTERASE URINE: ABNORMAL
NITRITE URINE: NEGATIVE
PH URINE: 5.5
PROTEIN URINE: ABNORMAL
SPECIFIC GRAVITY URINE: >=1.03
UROBILINOGEN URINE: 0.2 (ref 0.2–?)

## 2025-02-18 PROCEDURE — 0502F SUBSEQUENT PRENATAL CARE: CPT

## 2025-02-18 PROCEDURE — 81003 URINALYSIS AUTO W/O SCOPE: CPT | Mod: QW

## 2025-03-11 ENCOUNTER — ASOB RESULT (OUTPATIENT)
Age: 35
End: 2025-03-11

## 2025-03-11 ENCOUNTER — APPOINTMENT (OUTPATIENT)
Dept: ANTEPARTUM | Facility: CLINIC | Age: 35
End: 2025-03-11
Payer: COMMERCIAL

## 2025-03-11 ENCOUNTER — APPOINTMENT (OUTPATIENT)
Dept: OBGYN | Facility: CLINIC | Age: 35
End: 2025-03-11
Payer: COMMERCIAL

## 2025-03-11 VITALS
DIASTOLIC BLOOD PRESSURE: 70 MMHG | HEIGHT: 59 IN | BODY MASS INDEX: 30.64 KG/M2 | WEIGHT: 152 LBS | SYSTOLIC BLOOD PRESSURE: 116 MMHG

## 2025-03-11 LAB
APPEARANCE: CLEAR
BILIRUBIN URINE: NEGATIVE
BLOOD URINE: NEGATIVE
COLOR: YELLOW
GLUCOSE QUALITATIVE U: NEGATIVE
KETONES URINE: 15
LEUKOCYTE ESTERASE URINE: ABNORMAL
NITRITE URINE: NEGATIVE
PH URINE: 6.5
PROTEIN URINE: NEGATIVE
SPECIFIC GRAVITY URINE: 1.01
UROBILINOGEN URINE: 0.2 (ref 0.2–?)

## 2025-03-11 PROCEDURE — 76819 FETAL BIOPHYS PROFIL W/O NST: CPT

## 2025-03-11 PROCEDURE — 0502F SUBSEQUENT PRENATAL CARE: CPT

## 2025-03-11 PROCEDURE — 81003 URINALYSIS AUTO W/O SCOPE: CPT | Mod: QW

## 2025-03-11 PROCEDURE — 76816 OB US FOLLOW-UP PER FETUS: CPT

## 2025-03-18 ENCOUNTER — NON-APPOINTMENT (OUTPATIENT)
Age: 35
End: 2025-03-18

## 2025-03-18 ENCOUNTER — APPOINTMENT (OUTPATIENT)
Dept: OBGYN | Facility: CLINIC | Age: 35
End: 2025-03-18
Payer: COMMERCIAL

## 2025-03-18 VITALS
DIASTOLIC BLOOD PRESSURE: 70 MMHG | HEIGHT: 59 IN | SYSTOLIC BLOOD PRESSURE: 116 MMHG | BODY MASS INDEX: 30.84 KG/M2 | WEIGHT: 153 LBS

## 2025-03-18 DIAGNOSIS — Z34.93 ENCOUNTER FOR SUPERVISION OF NORMAL PREGNANCY, UNSPECIFIED, THIRD TRIMESTER: ICD-10-CM

## 2025-03-18 DIAGNOSIS — Z34.92 ENCOUNTER FOR SUPERVISION OF NORMAL PREGNANCY, UNSPECIFIED, SECOND TRIMESTER: ICD-10-CM

## 2025-03-18 LAB
APPEARANCE: CLEAR
BILIRUBIN URINE: NEGATIVE
BLOOD URINE: NEGATIVE
COLOR: YELLOW
GLUCOSE QUALITATIVE U: NEGATIVE
KETONES URINE: NEGATIVE
LEUKOCYTE ESTERASE URINE: ABNORMAL
NITRITE URINE: NEGATIVE
PH URINE: 7
PROTEIN URINE: 30
SPECIFIC GRAVITY URINE: 1.02
UROBILINOGEN URINE: 1 (ref 0.2–?)

## 2025-03-18 PROCEDURE — 0502F SUBSEQUENT PRENATAL CARE: CPT

## 2025-03-18 PROCEDURE — 81003 URINALYSIS AUTO W/O SCOPE: CPT | Mod: QW

## 2025-03-21 LAB
GP B STREP DNA SPEC QL NAA+PROBE: NOT DETECTED
HCT VFR BLD CALC: 35.8 %
HCV AB SER QL: NONREACTIVE
HCV S/CO RATIO: 0.06 S/CO
HGB BLD-MCNC: 12.2 G/DL
HIV1+2 AB SPEC QL IA.RAPID: NONREACTIVE
MCHC RBC-ENTMCNC: 32.7 PG
MCHC RBC-ENTMCNC: 34.1 G/DL
MCV RBC AUTO: 96 FL
PLATELET # BLD AUTO: 221 K/UL
RBC # BLD: 3.73 M/UL
RBC # FLD: 13.1 %
SOURCE GBS: NORMAL
T PALLIDUM AB SER QL IA: NEGATIVE
WBC # FLD AUTO: 8.75 K/UL

## 2025-03-25 ENCOUNTER — NON-APPOINTMENT (OUTPATIENT)
Age: 35
End: 2025-03-25

## 2025-03-25 ENCOUNTER — APPOINTMENT (OUTPATIENT)
Dept: OBGYN | Facility: CLINIC | Age: 35
End: 2025-03-25

## 2025-03-25 VITALS
HEIGHT: 59 IN | WEIGHT: 154 LBS | DIASTOLIC BLOOD PRESSURE: 76 MMHG | SYSTOLIC BLOOD PRESSURE: 114 MMHG | BODY MASS INDEX: 31.04 KG/M2

## 2025-03-25 LAB
APPEARANCE: CLEAR
BILIRUBIN URINE: NEGATIVE
BLOOD URINE: NEGATIVE
COLOR: YELLOW
GLUCOSE QUALITATIVE U: NEGATIVE
KETONES URINE: NEGATIVE
LEUKOCYTE ESTERASE URINE: ABNORMAL
NITRITE URINE: NEGATIVE
PH URINE: 6
PROTEIN URINE: ABNORMAL
SPECIFIC GRAVITY URINE: >=1.03
UROBILINOGEN URINE: 0.2 (ref 0.2–?)

## 2025-03-25 PROCEDURE — 81003 URINALYSIS AUTO W/O SCOPE: CPT | Mod: QW

## 2025-03-25 PROCEDURE — 0502F SUBSEQUENT PRENATAL CARE: CPT

## 2025-04-01 ENCOUNTER — APPOINTMENT (OUTPATIENT)
Dept: OBGYN | Facility: CLINIC | Age: 35
End: 2025-04-01

## 2025-04-01 VITALS
DIASTOLIC BLOOD PRESSURE: 70 MMHG | BODY MASS INDEX: 31.76 KG/M2 | WEIGHT: 157.56 LBS | SYSTOLIC BLOOD PRESSURE: 118 MMHG | HEIGHT: 59 IN

## 2025-04-01 LAB
APPEARANCE: CLEAR
BILIRUBIN URINE: NEGATIVE
BLOOD URINE: NEGATIVE
COLOR: YELLOW
GLUCOSE QUALITATIVE U: NEGATIVE
KETONES URINE: ABNORMAL
LEUKOCYTE ESTERASE URINE: ABNORMAL
NITRITE URINE: NEGATIVE
PH URINE: 6
PROTEIN URINE: 30
SPECIFIC GRAVITY URINE: 1.02
UROBILINOGEN URINE: 0.2 (ref 0.2–?)

## 2025-04-01 PROCEDURE — 81003 URINALYSIS AUTO W/O SCOPE: CPT | Mod: QW

## 2025-04-01 PROCEDURE — 0502F SUBSEQUENT PRENATAL CARE: CPT

## 2025-04-08 ENCOUNTER — NON-APPOINTMENT (OUTPATIENT)
Age: 35
End: 2025-04-08

## 2025-04-08 ENCOUNTER — APPOINTMENT (OUTPATIENT)
Dept: OBGYN | Facility: CLINIC | Age: 35
End: 2025-04-08
Payer: COMMERCIAL

## 2025-04-08 VITALS
DIASTOLIC BLOOD PRESSURE: 70 MMHG | HEIGHT: 59 IN | SYSTOLIC BLOOD PRESSURE: 120 MMHG | BODY MASS INDEX: 31.85 KG/M2 | WEIGHT: 158 LBS

## 2025-04-08 LAB
APPEARANCE: CLEAR
BILIRUBIN URINE: NEGATIVE
BLOOD URINE: NEGATIVE
COLOR: YELLOW
GLUCOSE QUALITATIVE U: NEGATIVE
KETONES URINE: ABNORMAL
LEUKOCYTE ESTERASE URINE: ABNORMAL
NITRITE URINE: NEGATIVE
PH URINE: 6.5
PROTEIN URINE: 100
SPECIFIC GRAVITY URINE: >=1.03
UROBILINOGEN URINE: 1 (ref 0.2–?)

## 2025-04-08 PROCEDURE — 81003 URINALYSIS AUTO W/O SCOPE: CPT | Mod: QW

## 2025-04-08 PROCEDURE — 0502F SUBSEQUENT PRENATAL CARE: CPT

## 2025-04-10 ENCOUNTER — NON-APPOINTMENT (OUTPATIENT)
Age: 35
End: 2025-04-10

## 2025-04-11 ENCOUNTER — NON-APPOINTMENT (OUTPATIENT)
Age: 35
End: 2025-04-11

## 2025-04-14 ENCOUNTER — TRANSCRIPTION ENCOUNTER (OUTPATIENT)
Age: 35
End: 2025-04-14

## 2025-04-14 ENCOUNTER — APPOINTMENT (OUTPATIENT)
Dept: ANTEPARTUM | Facility: CLINIC | Age: 35
End: 2025-04-14

## 2025-04-14 ENCOUNTER — INPATIENT (INPATIENT)
Facility: HOSPITAL | Age: 35
LOS: 1 days | Discharge: HOME | DRG: 833 | End: 2025-04-16
Attending: OBSTETRICS & GYNECOLOGY | Admitting: OBSTETRICS & GYNECOLOGY
Payer: COMMERCIAL

## 2025-04-14 VITALS
TEMPERATURE: 98 F | SYSTOLIC BLOOD PRESSURE: 110 MMHG | DIASTOLIC BLOOD PRESSURE: 71 MMHG | HEART RATE: 107 BPM | RESPIRATION RATE: 16 BRPM

## 2025-04-14 DIAGNOSIS — O26.899 OTHER SPECIFIED PREGNANCY RELATED CONDITIONS, UNSPECIFIED TRIMESTER: ICD-10-CM

## 2025-04-14 DIAGNOSIS — O26.893 OTHER SPECIFIED PREGNANCY RELATED CONDITIONS, THIRD TRIMESTER: ICD-10-CM

## 2025-04-14 LAB
BASOPHILS # BLD AUTO: 0.04 K/UL — SIGNIFICANT CHANGE UP (ref 0–0.2)
BASOPHILS NFR BLD AUTO: 0.4 % — SIGNIFICANT CHANGE UP (ref 0–2)
BLD GP AB SCN SERPL QL: SIGNIFICANT CHANGE UP
EOSINOPHIL # BLD AUTO: 0.07 K/UL — SIGNIFICANT CHANGE UP (ref 0–0.5)
EOSINOPHIL NFR BLD AUTO: 0.7 % — SIGNIFICANT CHANGE UP (ref 0–6)
HCT VFR BLD CALC: 35.2 % — SIGNIFICANT CHANGE UP (ref 34.5–45)
HGB BLD-MCNC: 12.3 G/DL — SIGNIFICANT CHANGE UP (ref 11.5–15.5)
IMM GRANULOCYTES # BLD AUTO: 0.25 K/UL — HIGH (ref 0–0.07)
IMM GRANULOCYTES NFR BLD AUTO: 2.3 % — HIGH (ref 0–0.9)
LYMPHOCYTES # BLD AUTO: 1.78 K/UL — SIGNIFICANT CHANGE UP (ref 1–3.3)
LYMPHOCYTES NFR BLD AUTO: 16.7 % — SIGNIFICANT CHANGE UP (ref 13–44)
MCHC RBC-ENTMCNC: 32.4 PG — SIGNIFICANT CHANGE UP (ref 27–34)
MCHC RBC-ENTMCNC: 34.9 G/DL — SIGNIFICANT CHANGE UP (ref 32–36)
MCV RBC AUTO: 92.6 FL — SIGNIFICANT CHANGE UP (ref 80–100)
MONOCYTES # BLD AUTO: 0.57 K/UL — SIGNIFICANT CHANGE UP (ref 0–0.9)
MONOCYTES NFR BLD AUTO: 5.3 % — SIGNIFICANT CHANGE UP (ref 2–14)
NEUTROPHILS # BLD AUTO: 7.95 K/UL — HIGH (ref 1.8–7.4)
NEUTROPHILS NFR BLD AUTO: 74.6 % — SIGNIFICANT CHANGE UP (ref 43–77)
NRBC # BLD AUTO: 0 K/UL — SIGNIFICANT CHANGE UP (ref 0–0)
NRBC # FLD: 0 K/UL — SIGNIFICANT CHANGE UP (ref 0–0)
NRBC BLD AUTO-RTO: 0 /100 WBCS — SIGNIFICANT CHANGE UP (ref 0–0)
PLATELET # BLD AUTO: 172 K/UL — SIGNIFICANT CHANGE UP (ref 150–400)
PMV BLD: 12.1 FL — SIGNIFICANT CHANGE UP (ref 7–13)
RBC # BLD: 3.8 M/UL — SIGNIFICANT CHANGE UP (ref 3.8–5.2)
RBC # FLD: 13 % — SIGNIFICANT CHANGE UP (ref 10.3–14.5)
WBC # BLD: 10.66 K/UL — HIGH (ref 3.8–10.5)
WBC # FLD AUTO: 10.66 K/UL — HIGH (ref 3.8–10.5)

## 2025-04-14 PROCEDURE — 59400 OBSTETRICAL CARE: CPT | Mod: U9

## 2025-04-14 RX ORDER — KETOROLAC TROMETHAMINE 30 MG/ML
30 INJECTION, SOLUTION INTRAMUSCULAR; INTRAVENOUS ONCE
Refills: 0 | Status: DISCONTINUED | OUTPATIENT
Start: 2025-04-14 | End: 2025-04-14

## 2025-04-14 RX ORDER — SIMETHICONE 80 MG
80 TABLET,CHEWABLE ORAL EVERY 4 HOURS
Refills: 0 | Status: DISCONTINUED | OUTPATIENT
Start: 2025-04-14 | End: 2025-04-16

## 2025-04-14 RX ORDER — DIPHENHYDRAMINE HCL 12.5MG/5ML
25 ELIXIR ORAL ONCE
Refills: 0 | Status: COMPLETED | OUTPATIENT
Start: 2025-04-14 | End: 2025-04-14

## 2025-04-14 RX ORDER — OXYTOCIN-SODIUM CHLORIDE 0.9% IV SOLN 30 UNIT/500ML 30-0.9/5 UT/ML-%
167 SOLUTION INTRAVENOUS
Qty: 30 | Refills: 0 | Status: DISCONTINUED | OUTPATIENT
Start: 2025-04-14 | End: 2025-04-16

## 2025-04-14 RX ORDER — WITCH HAZEL LEAF
1 FLUID EXTRACT MISCELLANEOUS EVERY 4 HOURS
Refills: 0 | Status: DISCONTINUED | OUTPATIENT
Start: 2025-04-14 | End: 2025-04-16

## 2025-04-14 RX ORDER — MAGNESIUM HYDROXIDE 400 MG/5ML
30 SUSPENSION ORAL
Refills: 0 | Status: DISCONTINUED | OUTPATIENT
Start: 2025-04-14 | End: 2025-04-16

## 2025-04-14 RX ORDER — ACETAMINOPHEN 500 MG/5ML
975 LIQUID (ML) ORAL
Refills: 0 | Status: DISCONTINUED | OUTPATIENT
Start: 2025-04-14 | End: 2025-04-16

## 2025-04-14 RX ORDER — OXYCODONE HYDROCHLORIDE 30 MG/1
5 TABLET ORAL ONCE
Refills: 0 | Status: DISCONTINUED | OUTPATIENT
Start: 2025-04-14 | End: 2025-04-16

## 2025-04-14 RX ORDER — OXYCODONE HYDROCHLORIDE 30 MG/1
5 TABLET ORAL
Refills: 0 | Status: DISCONTINUED | OUTPATIENT
Start: 2025-04-14 | End: 2025-04-16

## 2025-04-14 RX ORDER — PRENATAL 136/IRON/FOLIC ACID 27 MG-1 MG
1 TABLET ORAL DAILY
Refills: 0 | Status: DISCONTINUED | OUTPATIENT
Start: 2025-04-14 | End: 2025-04-16

## 2025-04-14 RX ORDER — DIBUCAINE 10 MG/G
1 OINTMENT TOPICAL EVERY 6 HOURS
Refills: 0 | Status: DISCONTINUED | OUTPATIENT
Start: 2025-04-14 | End: 2025-04-16

## 2025-04-14 RX ORDER — PRAMOXINE HCL 1 %
1 GEL (GRAM) TOPICAL EVERY 4 HOURS
Refills: 0 | Status: DISCONTINUED | OUTPATIENT
Start: 2025-04-14 | End: 2025-04-16

## 2025-04-14 RX ORDER — HYDROCORTISONE 10 MG/G
1 CREAM TOPICAL EVERY 6 HOURS
Refills: 0 | Status: DISCONTINUED | OUTPATIENT
Start: 2025-04-14 | End: 2025-04-16

## 2025-04-14 RX ORDER — CLOSTRIDIUM TETANI TOXOID ANTIGEN (FORMALDEHYDE INACTIVATED), CORYNEBACTERIUM DIPHTHERIAE TOXOID ANTIGEN (FORMALDEHYDE INACTIVATED), BORDETELLA PERTUSSIS TOXOID ANTIGEN (GLUTARALDEHYDE INACTIVATED), BORDETELLA PERTUSSIS FILAMENTOUS HEMAGGLUTININ ANTIGEN (FORMALDEHYDE INACTIVATED), BORDETELLA PERTUSSIS PERTACTIN ANTIGEN, AND BORDETELLA PERTUSSIS FIMBRIAE 2/3 ANTIGEN 5; 2; 2.5; 5; 3; 5 [LF]/.5ML; [LF]/.5ML; UG/.5ML; UG/.5ML; UG/.5ML; UG/.5ML
0.5 INJECTION, SUSPENSION INTRAMUSCULAR ONCE
Refills: 0 | Status: DISCONTINUED | OUTPATIENT
Start: 2025-04-14 | End: 2025-04-16

## 2025-04-14 RX ORDER — CITRIC ACID/SODIUM CITRATE 300-500 MG
30 SOLUTION, ORAL ORAL ONCE
Refills: 0 | Status: DISCONTINUED | OUTPATIENT
Start: 2025-04-14 | End: 2025-04-14

## 2025-04-14 RX ORDER — SODIUM CHLORIDE 9 G/1000ML
1000 INJECTION, SOLUTION INTRAVENOUS
Refills: 0 | Status: DISCONTINUED | OUTPATIENT
Start: 2025-04-14 | End: 2025-04-14

## 2025-04-14 RX ORDER — BENZOCAINE 220 MG/G
1 SPRAY, METERED PERIODONTAL EVERY 6 HOURS
Refills: 0 | Status: DISCONTINUED | OUTPATIENT
Start: 2025-04-14 | End: 2025-04-16

## 2025-04-14 RX ORDER — IBUPROFEN 200 MG
600 TABLET ORAL EVERY 6 HOURS
Refills: 0 | Status: COMPLETED | OUTPATIENT
Start: 2025-04-14 | End: 2026-03-13

## 2025-04-14 RX ORDER — OXYTOCIN-SODIUM CHLORIDE 0.9% IV SOLN 30 UNIT/500ML 30-0.9/5 UT/ML-%
167 SOLUTION INTRAVENOUS
Qty: 30 | Refills: 0 | Status: COMPLETED | OUTPATIENT
Start: 2025-04-14 | End: 2025-04-14

## 2025-04-14 RX ORDER — MODIFIED LANOLIN 100 %
1 CREAM (GRAM) TOPICAL EVERY 6 HOURS
Refills: 0 | Status: DISCONTINUED | OUTPATIENT
Start: 2025-04-14 | End: 2025-04-16

## 2025-04-14 RX ORDER — DIPHENHYDRAMINE HCL 12.5MG/5ML
25 ELIXIR ORAL EVERY 6 HOURS
Refills: 0 | Status: DISCONTINUED | OUTPATIENT
Start: 2025-04-14 | End: 2025-04-16

## 2025-04-14 RX ADMIN — Medication 975 MILLIGRAM(S): at 23:46

## 2025-04-14 RX ADMIN — KETOROLAC TROMETHAMINE 30 MILLIGRAM(S): 30 INJECTION, SOLUTION INTRAMUSCULAR; INTRAVENOUS at 21:32

## 2025-04-14 RX ADMIN — OXYTOCIN-SODIUM CHLORIDE 0.9% IV SOLN 30 UNIT/500ML 167 MILLIUNIT(S)/MIN: 30-0.9/5 SOLUTION at 20:55

## 2025-04-14 RX ADMIN — SODIUM CHLORIDE 125 MILLILITER(S): 9 INJECTION, SOLUTION INTRAVENOUS at 09:37

## 2025-04-14 RX ADMIN — Medication 25 MILLIGRAM(S): at 17:16

## 2025-04-14 RX ADMIN — KETOROLAC TROMETHAMINE 30 MILLIGRAM(S): 30 INJECTION, SOLUTION INTRAMUSCULAR; INTRAVENOUS at 22:00

## 2025-04-14 NOTE — OB RN DELIVERY SUMMARY - NSSELHIDDEN_OBGYN_ALL_OB_FT
[NS_DeliveryAttending1_OBGYN_ALL_OB_FT:[id number]],[NS_DeliveryRN_OBGYN_ALL_OB_FT:[id number]]

## 2025-04-14 NOTE — OB PROVIDER DELIVERY SUMMARY - NSPROVIDERDELIVERYNOTE_OBGYN_ALL_OB_FT
at  of a live female, 3590 gm and Apgars 9/9. Delivered ТАТЬЯНА, no nuchal cord, heavy meconium fluid. Infant's head delivered with maternal expulsive efforts. Shoulders delivered without difficulty followed by the rest of the body. Nose and mouth were bulb suctioned. Cord clamped and cut after delay. Samples obtained. Baby handed to patient. Placenta delivered spontaneously, intact, 3VC. Fundus firm, minimal bleeding. Perineum and vagina inspected – no lacerations noted. EBL 51cc. Hemostasis noted. Pt tolerated procedure well, in stable condition, recovering in LDR. Infant in LDR. Instrument/sponge count correct x 2 and confirmed by nurse.  at  of a live female, 3590 gm and Apgars 9/9. Delivered ТАТЬЯНА, no nuchal cord, heavy meconium fluid. Infant's head delivered with maternal expulsive efforts. Shoulders delivered without difficulty followed by the rest of the body. Nose and mouth were bulb suctioned. Cord clamped and cut after delay. Samples obtained. Baby handed to patient. Placenta delivered spontaneously, intact, 3VC. Fundus firm, minimal bleeding. Cervix inspected circumferentially due to anterior cervical lip that was reduced, cervix is intact with no bleeding. Perineum and vagina inspected – no lacerations noted. EBL 51cc. Hemostasis noted. Pt tolerated procedure well, in stable condition, recovering in LDR. Infant in LDR. Instrument/sponge count correct x 2 and confirmed by nurse.

## 2025-04-14 NOTE — DISCHARGE NOTE OB - HOSPITAL COURSE
Patient is s/p  at 40w1d uncomplicated delivery and post partum course. Labs and vitals stable upon discharge to home.

## 2025-04-14 NOTE — OB PROVIDER LABOR PROGRESS NOTE - NS_SUBJECTIVE/OBJECTIVE_OBGYN_ALL_OB_FT
Re-assessment of patient, in view of cat II tracing
Patient re-exmained at bedside. Reports feeling painful contractions. Declining epidural.
Reexamined and AROM meconium
Patient complains of increased pressure

## 2025-04-14 NOTE — OB PROVIDER DELIVERY SUMMARY - NSSELHIDDEN_OBGYN_ALL_OB_FT
[NS_DeliveryAttending1_OBGYN_ALL_OB_FT:[id number]],[NS_DeliveryAssist1_OBGYN_ALL_OB_FT:[id number]=]

## 2025-04-14 NOTE — DISCHARGE NOTE OB - PLAN OF CARE
Patient post-partum had an uncomplicated hospital course. Her pain was well controlled. She is tolerating a regular diet. She is ambulating independently. Labs and Vitals WNL upon discharge.  1) Please take ibuprofen and/or Tylenol as needed for pain  2) Nothing in the vagina for 6 weeks (including no sex, no tampons, and no douching).  3) Please call your doctor for a follow up your postpartum appointment in 4-6 weeks.  4) Please continue taking vitamins postpartum.   5) Please call the office sooner if you have heavy vaginal bleeding, severe abdominal pain, or fever > 100.4F.  6) You may resume regular daily activity as tolerated

## 2025-04-14 NOTE — OB PROVIDER H&P - NSLOWPPHRISK_OBGYN_A_OB
No previous uterine incision/[place] Pregnancy/Less than or equal to 4 previous vaginal births/No known bleeding disorder/No history of postpartum hemorrhage

## 2025-04-14 NOTE — DISCHARGE NOTE OB - CARE PLAN
1 Principal Discharge DX:	Normal spontaneous vaginal delivery  Assessment and plan of treatment:	Patient post-partum had an uncomplicated hospital course. Her pain was well controlled. She is tolerating a regular diet. She is ambulating independently. Labs and Vitals WNL upon discharge.  1) Please take ibuprofen and/or Tylenol as needed for pain  2) Nothing in the vagina for 6 weeks (including no sex, no tampons, and no douching).  3) Please call your doctor for a follow up your postpartum appointment in 4-6 weeks.  4) Please continue taking vitamins postpartum.   5) Please call the office sooner if you have heavy vaginal bleeding, severe abdominal pain, or fever > 100.4F.  6) You may resume regular daily activity as tolerated

## 2025-04-14 NOTE — OB PROVIDER H&P - LANGUAGE ASSISTANCE NEEDED
Writer spoke with patient's mother, Dion Verdugo, to obtain consent that patient is able to be seen and treated in the Urgent Care today, 4/27/2022, which includes any testing and imaging that may be ordered by the provider. Patient is being seen with pregnancy pains.  Parental consent has been witnessed by CAROLINA Rice. ROSIE Woodward, RNC translating with patient permission/Yes-Patient/Caregiver accepts free interpretation services...

## 2025-04-14 NOTE — OB RN DELIVERY SUMMARY - NS_SEPSISRSKCALC_OBGYN_ALL_OB_FT
EOS calculated successfully. EOS Risk Factor: 0.5/1000 live births ([facility] national incidence); GA=40w1d; Temp=98.42; ROM=10.017; GBS='Negative'; Antibiotics='No antibiotics or any antibiotics < 2 hrs prior to birth'

## 2025-04-14 NOTE — OB RN TRIAGE NOTE - LANGUAGE ASSISTANCE NEEDED
ROSIE Woodward, RNC translating with patient permission/Yes-Patient/Caregiver accepts free interpretation services...
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
## 2025-04-14 NOTE — OB PROVIDER LABOR PROGRESS NOTE - NS_OBIHIFHRDETAILS_OBGYN_ALL_OB_FT
155, moderate variability, - accels, + early decels
baseline 145, moderate variability, +accels, late decels
125, moderate variability, +accelerations, -decelerations
baseline FHR 130s, moderate variability, +accls, intermittent late decels
150, moderate variability, - accels, + earlies

## 2025-04-14 NOTE — OB PROVIDER H&P - HISTORY OF PRESENT ILLNESS
34y  at 40w1d who presents for contractions. She states that they started at 4AM q4-10min. She denies leakage of fluid. She reports some vaginal spotting. She reports good fetal movement.    Prenatal course uncomplicated.      POB: G1 (3/2011)  40w, 7lb 5oz. G2 () SAB 6w. G3 (2023)  39w1d, 6lb 1oz.  PGYN: -fibroids, -ovarian cysts, denies STD hx, denies abnormal PAPs   PMH: Denies  PSH: Denies  SH: Denies EtOH, tobacco and illicit drug use during this pregnancy; feels safe at home   Meds: PNVs  Allergies: NKDA
0

## 2025-04-14 NOTE — DISCHARGE NOTE OB - PATIENT PORTAL LINK FT
You can access the FollowMyHealth Patient Portal offered by [facility] by registering at the following website: [email or web address]. By joining [employer]’s FollowMyHealth portal, you will also be able to view your health information using other applications (apps) compatible with our system.

## 2025-04-14 NOTE — OB RN DELIVERY SUMMARY - BABY A SEX
[facility] OCCUPATIONAL THERAPY  HOME EXERCISE PROGRAM      Complete massage for 2-3 minutes 4 times a day:      Complete 10 repetitions of each exercise 4 times a day:                       Male

## 2025-04-14 NOTE — OB PROVIDER LABOR PROGRESS NOTE - ASSESSMENT
Plan  -Category 1 tracing  -Continuous FHR and toco monitoring  -SVE in 3h  -C/w expt mgmt  
-FHR tracing Cat I   -Continue expectant management   -SVE as indicated / prn 
A/P:    Cat II tracing   SVE : 9.5cm dilated  Patient repositioned to right lateral position  Cont resus maneuvers    To discussed with SVC attending     
-FHR tracing Cat I  -Trial of pushes without reduction of R cervical lip   -Patient in R lateral position   -Continue expectant management  -SVE as indicated / prn 
-VSS  -FHT Cat II  -Will reposition patient  -Will give IV bolus  -Continue with expectant management

## 2025-04-14 NOTE — OB PROVIDER H&P - ATTENDING COMMENTS
patient admitted in early labor, she is very uncomforatble with contractions  does not desire an epidural   FHRT category I   prenatal care uncomplicated   GBS  negative   expectant management

## 2025-04-14 NOTE — OB PROVIDER H&P - NS_PRENATALLABSOURCEGBS1PN_OBGYN_ALL_OB
Quality 226: Preventive Care And Screening: Tobacco Use: Screening And Cessation Intervention: Patient screened for tobacco use and is an ex/non-smoker Detail Level: Detailed Quality 431: Preventive Care And Screening: Unhealthy Alcohol Use - Screening: Patient not identified as an unhealthy alcohol user when screened for unhealthy alcohol use using a systematic screening method N/A

## 2025-04-14 NOTE — OB PROVIDER H&P - NSHPPHYSICALEXAM_GEN_ALL_CORE
T(C): 36.7 (04-14-25 @ 08:45), Max: 36.7 (04-14-25 @ 08:45)  HR: 107 (04-14-25 @ 08:45) (107 - 107)  BP: 110/71 (04-14-25 @ 08:45) (110/71 - 110/71)  RR: 16 (04-14-25 @ 08:45) (16 - 16)  SpO2: --    Gen: NAD, well-appearing, AAOx3   Abd: Soft, gravid  Ext: non-tender, non-edematous    SVE:  3/70/-3, intact, bloody mucus @0900  Bedside sono:  FHT: 145, moderate variability, -accelerations, -decelerations   [place]: q5min T(C): 36.7 (04-14-25 @ 08:45), Max: 36.7 (04-14-25 @ 08:45)  HR: 107 (04-14-25 @ 08:45) (107 - 107)  BP: 110/71 (04-14-25 @ 08:45) (110/71 - 110/71)  RR: 16 (04-14-25 @ 08:45) (16 - 16)    Gen: NAD, well-appearing, AAOx3   Abd: Soft, gravid  Ext: non-tender, non-edematous    SVE:  3/70/-3, intact, bloody mucus @0900  Bedside sono:  FHT: 145, moderate variability, -accelerations, -decelerations   [place]: q5min

## 2025-04-14 NOTE — OB NEONATOLOGY/PEDIATRICIAN DELIVERY SUMMARY - NSPEDSNEONOTESA_OBGYN_ALL_OB_FT
Called to delivery by OB for meconium and NRFHT. Baby Konstantin Ulloa born at 40.1 via  to a 33yo  A+, Hep B neg, HIV NR, RPR NR, Rubella Imm, GBS negative mother. No significant maternal hx or prenatal complications. AROM meconium stained fluid 10h prior to delivery. Highest maternal temp 36.9C. Baby emerged vigorous and crying. Delayed cord clamping 30s. Warmed, dried, suctioned, stimulated. Apgar 9/9. Admit to  nursery. EOS 0.12. Exam significant for extensive molding.

## 2025-04-14 NOTE — OB PROVIDER H&P - ASSESSMENT
34y  at 40w1d who presents in early labor    -Admit to L&D  -Consent  -Admission labs  -IV fluids  -Labor: 3/70/-3 @0900. Intact. AROM after admission.  -Fetus: Cat I tracing. Continuous toco and fetal monitoring.   -GBS: Negative, no GBS ppx required   -Analgesia: Declines epidural    Discussed with Dr. Hi  To be discussed with Dr. Gruber

## 2025-04-14 NOTE — DISCHARGE NOTE OB - CARE PROVIDER_API CALL
Tevin Hi  Obstetrics and Gynecology  [address] [place] [address]  Phone: [phone]  Fax: [phone]  Follow Up Time: 1 month

## 2025-04-14 NOTE — DISCHARGE NOTE OB - MEDICATION SUMMARY - MEDICATIONS TO TAKE
I will START or STAY ON the medications listed below when I get home from the hospital:    ibuprofen 600 mg oral tablet  -- 1 tab(s) by mouth every 6 hours  -- Indication: For pain    acetaminophen 325 mg oral tablet  -- 3 tab(s) by mouth every 6 hours  -- Indication: For pain   I will START or STAY ON the medications listed below when I get home from the hospital:    ibuprofen 600 mg oral tablet  -- 1 tab(s) by mouth every 6 hours  -- Indication: For pain    Tylenol 325 mg oral tablet  -- 2 tab(s) by mouth every 6 hours  -- Indication: For pain

## 2025-04-15 ENCOUNTER — NON-APPOINTMENT (OUTPATIENT)
Age: 35
End: 2025-04-15

## 2025-04-15 LAB
HCT VFR BLD CALC: 29.8 % — LOW (ref 34.5–45)
HGB BLD-MCNC: 10.4 G/DL — LOW (ref 11.5–15.5)
T PALLIDUM AB TITR SER: NEGATIVE — SIGNIFICANT CHANGE UP

## 2025-04-15 RX ORDER — IBUPROFEN 200 MG
600 TABLET ORAL EVERY 6 HOURS
Refills: 0 | Status: DISCONTINUED | OUTPATIENT
Start: 2025-04-15 | End: 2025-04-16

## 2025-04-15 RX ADMIN — Medication 975 MILLIGRAM(S): at 23:50

## 2025-04-15 RX ADMIN — Medication 975 MILLIGRAM(S): at 17:59

## 2025-04-15 RX ADMIN — Medication 1 TABLET(S): at 11:51

## 2025-04-15 RX ADMIN — Medication 600 MILLIGRAM(S): at 03:32

## 2025-04-15 RX ADMIN — Medication 600 MILLIGRAM(S): at 21:00

## 2025-04-15 RX ADMIN — Medication 600 MILLIGRAM(S): at 08:46

## 2025-04-15 RX ADMIN — Medication 975 MILLIGRAM(S): at 11:51

## 2025-04-15 RX ADMIN — Medication 975 MILLIGRAM(S): at 05:55

## 2025-04-15 RX ADMIN — Medication 600 MILLIGRAM(S): at 15:25

## 2025-04-15 NOTE — PROGRESS NOTE ADULT - SUBJECTIVE AND OBJECTIVE BOX
HORTENSIA ZAMORA is a 34y  now PPD#1 s/p spontaneous vaginal delivery at 40w1d gestation, uncomplicated.    S:    No acute events overnight.   The patient reports some cramping abdominal pain and occasional nausea, no vomiting.   Pain controlled with current treatment regimen.   She is ambulating without difficulty and tolerating PO.   + voiding   She endorses appropriate lochia, which is decreasing.   She denies fevers, chills, nausea and vomiting.   She denies lightheadedness, dizziness, palpitations, chest pain and SOB.     O:    T(C): 37.5 (04-15-25 @ 04:00), Max: 37.5 (04-15-25 @ 04:00)  HR: 94 (04-15-25 @ 04:00) (80 - 151)  BP: 93/53 (04-15-25 @ 04:00) (93/53 - 147/70)  RR: 16 (04-15-25 @ 04:00) (16 - 20)  SpO2: 95% (04-15-25 @ 04:00) (91% - 100%)    Gen: NAD, AOx3  Pulm: Breathing comfortably on room air  Abdomen:  Soft, non-tender, non-distended   Uterus:  Fundus firm below umbilicus  VE:  Expectant lochia  Ext:  Non-tender and non-edematous                          10.4   x     )-----------( x        ( 15 Apr 2025 05:30 )             29.8

## 2025-04-15 NOTE — PROGRESS NOTE ADULT - ATTENDING COMMENTS
HORTENSIA ZAMORA is a 34y  now PPD#1 s/p spontaneous vaginal delivery at 40w1d gestation, uncomplicated.    ICU Vital Signs Last 24 Hrs  T(C): 37.5 (15 Apr 2025 04:00), Max: 37.5 (15 Apr 2025 04:00)  T(F): 99.5 (15 Apr 2025 04:00), Max: 99.5 (15 Apr 2025 04:00)  HR: 94 (15 Apr 2025 04:00) (80 - 151)  BP: 93/53 (15 Apr 2025 04:00) (93/53 - 147/70)  RR: 16 (15 Apr 2025 04:00) (16 - 20)  SpO2: 95% (15 Apr 2025 04:00) (91% - 100%)    Plan:  - VSS  - H/h stable  - Pain controlled with standing meds  - Healthy male infant --declines circ  - Continue routine postpartm care  - Likely dicsharge

## 2025-04-15 NOTE — PROGRESS NOTE ADULT - ASSESSMENT
INTERVAL HPI/OVERNIGHT EVENTS:  34y Female s/p labor epidural on 4/14/25    Vital Signs Last 24 Hrs  T(C): 37.5 (15 Apr 2025 04:00), Max: 37.5 (15 Apr 2025 04:00)  T(F): 99.5 (15 Apr 2025 04:00), Max: 99.5 (15 Apr 2025 04:00)  HR: 94 (15 Apr 2025 04:00) (80 - 151)  BP: 93/53 (15 Apr 2025 04:00) (93/53 - 147/70)  BP(mean): --  RR: 16 (15 Apr 2025 04:00) (16 - 20)  SpO2: 95% (15 Apr 2025 04:00) (91% - 100%)    Parameters below as of 15 Apr 2025 04:00  Patient On (Oxygen Delivery Method): room air            Patient's overall anesthesia satisfaction: Positive    Patient doing well     No headache      No residual numbness or weakness, sensory and motor function intact    No anesthetic complications or complaints noted or reported

## 2025-04-15 NOTE — PROGRESS NOTE ADULT - ASSESSMENT
A/P:    HORTENSIA ZAMORA is a 34y  now PPD#1 s/p spontaneous vaginal delivery at 40w1d gestation, uncomplicated.    -Vital signs stable  -Hgb: 12.3 -> 10.4  -Voiding, tolerating PO, bowel function nml   -Advance care as tolerated   -Continue routine postpartum care and education  -Healthy male infant, declines circumcision  -Dispo: Patient to be discharged when meeting all postpartum  milestones and pending attending approval.

## 2025-04-16 VITALS
SYSTOLIC BLOOD PRESSURE: 99 MMHG | DIASTOLIC BLOOD PRESSURE: 59 MMHG | OXYGEN SATURATION: 96 % | HEART RATE: 70 BPM | RESPIRATION RATE: 18 BRPM | TEMPERATURE: 98 F

## 2025-04-16 PROCEDURE — 85018 HEMOGLOBIN: CPT

## 2025-04-16 PROCEDURE — 85025 COMPLETE CBC W/AUTO DIFF WBC: CPT

## 2025-04-16 PROCEDURE — 59050 FETAL MONITOR W/REPORT: CPT

## 2025-04-16 PROCEDURE — 85014 HEMATOCRIT: CPT

## 2025-04-16 PROCEDURE — 86901 BLOOD TYPING SEROLOGIC RH(D): CPT

## 2025-04-16 PROCEDURE — 86900 BLOOD TYPING SEROLOGIC ABO: CPT

## 2025-04-16 PROCEDURE — 86780 TREPONEMA PALLIDUM: CPT

## 2025-04-16 PROCEDURE — 36415 COLL VENOUS BLD VENIPUNCTURE: CPT

## 2025-04-16 PROCEDURE — 86850 RBC ANTIBODY SCREEN: CPT

## 2025-04-16 RX ORDER — ACETAMINOPHEN 500 MG/5ML
2 LIQUID (ML) ORAL
Qty: 56 | Refills: 0
Start: 2025-04-16 | End: 2025-04-22

## 2025-04-16 RX ORDER — IBUPROFEN 200 MG
1 TABLET ORAL
Qty: 28 | Refills: 0
Start: 2025-04-16 | End: 2025-04-22

## 2025-04-16 RX ADMIN — Medication 600 MILLIGRAM(S): at 09:05

## 2025-04-16 RX ADMIN — Medication 600 MILLIGRAM(S): at 03:10

## 2025-04-16 RX ADMIN — Medication 600 MILLIGRAM(S): at 14:18

## 2025-04-16 RX ADMIN — Medication 1 TABLET(S): at 11:51

## 2025-04-16 RX ADMIN — Medication 975 MILLIGRAM(S): at 11:50

## 2025-04-16 RX ADMIN — Medication 975 MILLIGRAM(S): at 05:23

## 2025-04-16 NOTE — PROGRESS NOTE ADULT - ASSESSMENT
A/P:    HORTENSIA ZAMORA is a 34y  now PPD#2 s/p spontaneous vaginal delivery at 40w1d gestation, uncomplicated.    -Vital signs stable  -Hgb: 12.3 -> 10.4  -Voiding, tolerating PO, bowel function nml   -Advance care as tolerated   -Continue routine postpartum care and education  -Healthy male infant, declines circumcision  -Dispo: Patient meeting all postpartum  milestones and pending discharge with attending approval.   A/P:    HORTENSIA ZAMORA is a 34y  now PPD#2 s/p spontaneous vaginal delivery at 40w1d gestation, uncomplicated.    -Vital signs stable  -Hgb: 12.3 -> 10.4  -Voiding, tolerating PO, bowel function nml   -Advance care as tolerated   -Continue routine postpartum care and education  -Healthy male infant, declines circumcision  -Dispo: Patient meeting all postpartum  milestones and pending discharge with attending approval. Discharge home    Addendum:    Subjective Hx, Physical Exam, & Laboratory results reviewed and Pt seen and examined at bedside.  I agree with the Resident Physician's assessment and plan of care, as discussed above.  She was given the opportunity to ask questions and all were addressed.    Nikko Adamson, DO for Tevin Hi, DO

## 2025-04-16 NOTE — PROGRESS NOTE ADULT - SUBJECTIVE AND OBJECTIVE BOX
HORTENSIA ZAMORA is a 34y  now PPD#2 s/p spontaneous vaginal delivery at 40w1d gestation, uncomplicated.    S:    No acute events overnight.   The patient has no complaint.   Pain controlled with current treatment regimen.   She is ambulating without difficulty and tolerating PO.   + voiding   She endorses appropriate lochia, which is decreasing.   She denies fevers, chills, nausea and vomiting.   She denies lightheadedness, dizziness, palpitations, chest pain and SOB.     O:    Vital Signs Last 24 Hrs  T(C): 36.5 (2025 04:00), Max: 36.9 (15 Apr 2025 08:35)  T(F): 97.7 (2025 04:00), Max: 98.4 (15 Apr 2025 08:35)  HR: 70 (2025 04:00) (70 - 86)  BP: 99/59 (2025 04:00) (96/64 - 112/71)  RR: 18 (2025 04:00) (17 - 18)  SpO2: 96% (2025 04:00) (96% - 97%)    Parameters below as of 2025 04:00  Patient On (Oxygen Delivery Method): room air      Gen: NAD, AOx3  Pulm: Breathing comfortably on room air  Abdomen:  Soft, non-tender, non-distended   Uterus:  Fundus firm below umbilicus  VE:  Expectant lochia  Ext:  Non-tender and non-edematous                            10.4   x     )-----------( x        ( 15 Apr 2025 05:30 )             29.8

## 2025-04-17 ENCOUNTER — APPOINTMENT (OUTPATIENT)
Dept: OBGYN | Facility: CLINIC | Age: 35
End: 2025-04-17

## 2025-07-17 ENCOUNTER — APPOINTMENT (OUTPATIENT)
Dept: OBGYN | Facility: CLINIC | Age: 35
End: 2025-07-17

## 2025-07-17 VITALS
HEIGHT: 59 IN | BODY MASS INDEX: 29.03 KG/M2 | DIASTOLIC BLOOD PRESSURE: 80 MMHG | SYSTOLIC BLOOD PRESSURE: 120 MMHG | WEIGHT: 144 LBS

## 2025-07-17 PROBLEM — B37.31 YEAST VAGINITIS: Status: ACTIVE | Noted: 2025-07-17 | Resolved: 2025-08-16

## 2025-07-17 PROCEDURE — 99214 OFFICE O/P EST MOD 30 MIN: CPT | Mod: 24,25

## 2025-07-17 PROCEDURE — 99395 PREV VISIT EST AGE 18-39: CPT

## 2025-07-17 PROCEDURE — 99459 PELVIC EXAMINATION: CPT

## 2025-07-17 RX ORDER — TERCONAZOLE 4 MG/G
0.4 CREAM VAGINAL DAILY
Qty: 1 | Refills: 3 | Status: ACTIVE | COMMUNITY
Start: 2025-07-17 | End: 1900-01-01

## 2025-08-14 ENCOUNTER — APPOINTMENT (OUTPATIENT)
Dept: OBGYN | Facility: CLINIC | Age: 35
End: 2025-08-14